# Patient Record
Sex: FEMALE | Race: WHITE | NOT HISPANIC OR LATINO | Employment: FULL TIME | URBAN - METROPOLITAN AREA
[De-identification: names, ages, dates, MRNs, and addresses within clinical notes are randomized per-mention and may not be internally consistent; named-entity substitution may affect disease eponyms.]

---

## 2017-01-24 ENCOUNTER — OFFICE VISIT (OUTPATIENT)
Dept: PSYCHOLOGY | Facility: CLINIC | Age: 47
End: 2017-01-24
Payer: COMMERCIAL

## 2017-01-24 DIAGNOSIS — F43.22 ADJUSTMENT DISORDER WITH ANXIETY: Primary | ICD-10-CM

## 2017-01-24 PROCEDURE — 90791 PSYCH DIAGNOSTIC EVALUATION: CPT | Performed by: PSYCHOLOGIST

## 2017-01-24 ASSESSMENT — ANXIETY QUESTIONNAIRES
6. BECOMING EASILY ANNOYED OR IRRITABLE: SEVERAL DAYS
7. FEELING AFRAID AS IF SOMETHING AWFUL MIGHT HAPPEN: NOT AT ALL
IF YOU CHECKED OFF ANY PROBLEMS ON THIS QUESTIONNAIRE, HOW DIFFICULT HAVE THESE PROBLEMS MADE IT FOR YOU TO DO YOUR WORK, TAKE CARE OF THINGS AT HOME, OR GET ALONG WITH OTHER PEOPLE: SOMEWHAT DIFFICULT
5. BEING SO RESTLESS THAT IT IS HARD TO SIT STILL: SEVERAL DAYS
3. WORRYING TOO MUCH ABOUT DIFFERENT THINGS: SEVERAL DAYS
1. FEELING NERVOUS, ANXIOUS, OR ON EDGE: SEVERAL DAYS
GAD7 TOTAL SCORE: 5
2. NOT BEING ABLE TO STOP OR CONTROL WORRYING: NOT AT ALL

## 2017-01-24 ASSESSMENT — PATIENT HEALTH QUESTIONNAIRE - PHQ9: 5. POOR APPETITE OR OVEREATING: SEVERAL DAYS

## 2017-01-24 NOTE — PROGRESS NOTES
"                                                                                                                                                                        Adult Intake Structured Interview  Standard Diagnostic Assessment      CLIENT'S NAME: Jie Siddiqui  MRN:   [id number]  :   1970  ACCT. NUMBER: [id number]  DATE OF SERVICE: 17      Identifying Information:  Client is a 46 year old, ,  and repartnered female. Client was referred for counseling by self. Client is currently employed full time. 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      Client's Statement of Presenting Concern:  Client reports the reason for seeking therapy at this time as Jie was recently told by her 12 year boyfriend that he does not want her to move in because she gets angry.  Client stated that her symptoms have resulted in the following functional impairments: home life with Seven and daughters, management of the household and or completion of tasks and social interactions      History of Presenting Concern:  Client reports that these problem(s) began a month ago. Client has attempted to resolve these concerns in the past through talking through with Seven. Client reports that other professional(s) are not involved in providing support / services.     Social History:  Client reported she grew up in [place], [place]. They were the first born of 2 children. This is an intact family and parents remain . Client reported that her childhood was \"quiet, simple, not well encouraged and supported by family and parents. Client described her current relationships with family of origin as oksy.    Client reported a history of 2 committed relationships and 1 marriage. Client has been  for 9 years to Kirk and then with Seven for 12 years. Client reported having 2 children with Kirk: Raquel age 15 and Danyell age 12. Client identified some stable and meaningful social connections. Client " "reported that she has not been involved with the legal system.  Client's highest education level was graduate school. Client did not identify any learning problems. There are no ethnic, cultural or Protestant factors that may be relevant for therapy. Client identified her preferred language to be English. Client reported she does not need the assistance of an  or other support involved in therapy. Modifications will not be used to assist communication in therapy. Client did not serve in the .     Client reports family history includes CANCER in her paternal grandmother; CEREBROVASCULAR DISEASE in her paternal grandmother; Lipids in her father and paternal grandmother; Psychotic Disorder in her maternal aunt. There is no history of Asthma, C.A.D., DIABETES, Hypertension, Breast Cancer, Cancer - colorectal, Prostate Cancer, Alcohol/Drug, Allergies, Alzheimer Disease, Anesthesia Reaction, Arthritis, Blood Disease, or Cardiovascular.    Mental Health History:  Client reported the following biological family members or relatives with mental health issues: Mother experienced Anxiety and Aunt experienced Bipolar Disorder and Depression.  Client has not been previously diagnosed with a mental health diagnosis.  Client has received the following mental health services in the past: counseling - \"Many times.\"  Hospitalizations: None.  Client is not currently receiving any mental health services.  Jie is not interested in medications.    Chemical Health History:  Client reported the following biological family members or relatives with chemical health issues: mom. Client has not received chemical dependency treatment in the past. Client is not currently receiving any chemical dependency treatment. Client reports no problems as a result of their drinking / drug use.    Client Reports:  Client reports using alcohol 2-4 times per week and has 1 small glass of wine at a time. Client first started drinking at age " 16.  Client denies using tobacco.  Client denies using marijuana.  Client reports using caffeine 1 times per day and drinks 1 at a time. Client started using caffeine at age 20.  Client denies using street drugs.  Client denies the non-medical use of prescription or over the counter drugs.    CAGE: G     Patient felt bad or GUILTY about their drinking (or drug use).   Based on the negative Cage-Aid score and clinical interview there  are not indications of drug or alcohol abuse.    Discussed the general effects of drugs and alcohol on health and well-being.    Significant Losses / Trauma / Abuse / Neglect Issues:  There are indications or report of significant loss, trauma, abuse or neglect issues related to: divorce / relational changes 2005 and gets along well with Kirk.    Issues of possible neglect are not present.    Medical Issues:  Client has had a physical exam to rule out medical causes for current symptoms. Date of last physical exam was within the past year. Client was encouraged to follow up with PCP if symptoms were to develop. The client has a [facility] Care Provider, who is named [place]. The client reports not having a psychiatrist. Client reports no current medical concerns. The client denies the presence of chronic or episodic pain. There are not significant nutritional concerns.     Client reports current medications as NA    Client Allergies:  Allergies   Allergen Reactions     Erythromycin      hives     Morphine      Hives       no known allergies to medications    Medical History:  Past Medical History   Diagnosis Date     Asthma lifetime     Endometriosis 2000     Polycystic ovarian disease (PCOD) 2000     Dysmenorrhea          Medication Adherence:  N/A - Client does not have prescribed psychiatric medications.    Client was provided recommendation to follow-up with prescribing physician.    Mental Status Assessment:  Appearance:   Appropriate   Eye  Contact:   Good   Psychomotor Behavior: Normal   Attitude:   Cooperative  Guarded  defensive   Orientation:   All  Speech   Rate / Production: Normal    Volume:  Normal   Mood:    Angry  Elevated  Normal  Affect:    Appropriate  Expansive   Thought Content:  Clear  Perservative   Thought Form:  Coherent  Logical   Insight:    Fair       Review of Symptoms:  Depression: No symptoms  Grecia:  No symptoms  Psychosis: No symptoms  Anxiety: Worries Nervousness Triggers: when losing independence  restless, busy all of the time  Panic:  No symptoms  Post Traumatic Stress Disorder: No symptoms  Obsessive Compulsive Disorder: No symptoms  Eating Disorder: No symptoms  Oppositional Defiant Disorder: No symptoms  ADD / ADHD: No symptoms  Conduct Disorder: No symptoms        Safety Issues and Plan for Safety and Risk Management:  Client denies a history of suicidal ideation, suicide attempts, self-injurious behavior, homicidal ideation, homicidal behavior and and other safety concerns  Client denies current fears or concerns for personal safety.  Client denies current or recent suicidal ideation or behaviors.  Client denies current or recent homicidal ideation or behaviors.  Client denies current or recent self injurious behavior or ideation.  Client denies other safety concerns.  Client reports there are no firearms in the house.  A safety and risk management plan has not been developed at this time, however client was given the after-hours number / 911 should there be a change in any of these risk factors.    Client's Strengths and Limitations:  Client identified the following strengths or resources that will help her succeed in counseling: commitment to health and well being, yesenia / spirituality, friends / good social support, family support, intelligence, positive work environment and sense of humor. Client identified the following supports: family and Buddhism / spirituality. Things that may interfere with the clients  success in counseling include:quick to be defensive - possibly shame-based.    Diagnostic Criteria:  A. The development of emotional or behavioral symptoms in response to an identifiable stressor(s) occurring within 3 months of the onset of the stressor(s)  B. These symptoms or behaviors are clinically significant, as evidenced by one or both of the following:  C. The stress-related disturbance does not meet criteria for another disorder & is not not an exacerbation of another mental disorder  D. The symptoms do not represent normal bereavement  E. Once the stressor or its consequences have terminated, the symptoms do not persist for more than an additional 6 months       * Adjustment Disorder with Anxiety: The predominant manfestations are symptoms such as nervousness, worry, or jitteriness, or, in children separation anxiety from major attachment figures    Functional Status:  Client's symptoms are causing reduced functional status in the following areas: Social / Relational - gets frustrated easily      DSM5 Diagnoses: (Sustained by DSM5 Criteria Listed Above)  Diagnoses: Adjustment Disorders  309.24 (F43.22) With anxiety  Psychosocial & Contextual Factors: relational problems, overly independent  WHODAS 2.0 (12 item)            This questionnaire asks about difficulties due to health conditions. Health conditions  include  disease or illnesses, other health problems that may be short or long lasting,  injuries, mental health or emotional problems, and problems with alcohol or drugs.                     Think back over the past 30 days and answer these questions, thinking about how much  difficulty you had doing the following activities. For each question, please [place] only  one response.    S1 Standing for long periods such as 30 minutes? None =         1   S2 Taking care of household responsibilities? None =         1   S3 Learning a new task, for example, learning how to get to a new place? None =         1    S4 How much of a problem do you have joining community activities (for example, festivals, Congregational or other activities) in the same way as anyone else can? None =         1   S5 How much have you been emotionally affected by your health problems? None =         1     In the past 30 days, how much difficulty did you have in:   S6 Concentrating on doing something for ten minutes? None =         1   S7 Walking a long distance such as a kilometer (or equivalent)? None =         1   S8 Washing your whole body? None =         1   S9 Getting dressed? None =         1   S10 Dealing with people you do not know? None =         1   S11 Maintaining a friendship? None =         1   S12 Your day to day work? None =         1     H1 Overall, in the past 30 days, how many days were these difficulties present? Record number of days 0   H2 In the past 30 days, for how many days were you totally unable to carry out your usual activities or work because of any health condition? Record number of days  0   H3 In the past 30 days, not counting the days that you were totally unable, for how many days did you cut back or reduce your usual activities or work because of any health condition? Record number of days 0     Attendance Agreement:  Client has signed Attendance Agreement:Yes      Preliminary Treatment Plan:  The client reports no currently identified Congregational, ethnic or cultural issues relevant to therapy.     services are not indicated.    Modifications to assist communication are not indicated.    The concerns identified by the client will be addressed in therapy.    Initial Treatment will focus on: Anxiety - upset and gets defensive  Relational Problems related to: decision about moving in with Seven.    The focus of initial interventions will be to alleviate anxiety, alleviate lability of mood, facilitate appropriate expression of feelings, teach emotional regulation and teach mindfulness skills.    Collaboration  with other professionals is not indicated at this time.    Referral to another professional/service is not indicated at this time..    A Release of Information is not needed at this time.    Report to child / adult protection services was NA.    Client will have access to their [facility]' medical record.    Bety Dexter, AYANNA  January 24, 2017

## 2017-01-25 ASSESSMENT — ANXIETY QUESTIONNAIRES: GAD7 TOTAL SCORE: 5

## 2017-01-25 ASSESSMENT — PATIENT HEALTH QUESTIONNAIRE - PHQ9: SUM OF ALL RESPONSES TO PHQ QUESTIONS 1-9: 0

## 2017-01-30 ENCOUNTER — OFFICE VISIT (OUTPATIENT)
Dept: PSYCHOLOGY | Facility: CLINIC | Age: 47
End: 2017-01-30
Payer: COMMERCIAL

## 2017-01-30 ENCOUNTER — VIRTUAL VISIT (OUTPATIENT)
Dept: FAMILY MEDICINE | Facility: OTHER | Age: 47
End: 2017-01-30

## 2017-01-30 DIAGNOSIS — F43.22 ADJUSTMENT DISORDER WITH ANXIETY: Primary | ICD-10-CM

## 2017-01-30 PROCEDURE — 90837 PSYTX W PT 60 MINUTES: CPT | Performed by: PSYCHOLOGIST

## 2017-01-30 NOTE — PROGRESS NOTES
Progress Note    Client Name: Jie Siddiqui  Date: 1/30/2017       Service Type: Individual      Session Start Time: 3:00  Session End Time: 3:52      Session Length: 52 min     Session #: 2     Attendees: Client attended alone    Treatment Plan Last Reviewed: today  PHQ-9 / LUCIANO-7: low score     DATA      Progress Since Last Session (Related to Symptoms / Goals / Homework):   Symptoms: Improved    Homework: Partially completed, she ordered the book and listened ans talked more to Seven      Episode of Care Goals: Satisfactory progress - ACTION (Actively working towards change); Intervened by reinforcing change plan / affirming steps taken     Current / Ongoing Stressors and Concerns:   Relational problems. Seven said he didn't want to live with her because she is too intense and angry.     Treatment Objective(s) Addressed in This Session:   practice deep diaphragm breathing once daily for at least 5 minutes to reduce anger / irritability and regain a calmer, more clear state of mind  track and record at least 2 pleasant exchanges with boyfriend  Improve communication     Intervention:   CBT: thoughts about all or nothing thinking  Interpersonal Therapy: to be more positive and reduce judgement.   Identified goals     ASSESSMENT: Current Emotional / Mental Status (status of significant symptoms):   Risk status (Self / Other harm or suicidal ideation)  Client denies a history of suicidal ideation, suicide attempts, self-injurious behavior, homicidal ideation, homicidal behavior and and other safety concerns   Client denies current fears or concerns for personal safety.   Client denies current or recent suicidal ideation or behaviors.   Client denies current or recent homicidal ideation or behaviors.   Client denies current or recent self injurious behavior or ideation.   Client denies other safety concerns.   A safety and risk management plan has not been developed at this  time, however client was given the after-hours number / 911 should there be a change in any of these risk factors.     Appearance:   Appropriate    Eye Contact:   Good    Psychomotor Behavior: Normal    Attitude:   Cooperative  that she knows things   Orientation:   All   Speech    Rate / Production: Normal     Volume:  Normal    Mood:    Normal   Affect:    Appropriate    Thought Content:  Clear    Thought Form:  Coherent  Logical    Insight:    Fair      Medication Review:   No current psychiatric medications prescribed     Medication Compliance:   NA     Changes in Health Issues:   None reported     Chemical Use Review:   Substance Use: Chemical use reviewed, no active concerns identified      Tobacco Use: No current tobacco use.       Collateral Reports Completed:   Not Applicable    PLAN: (Client Tasks / Therapist Tasks / Other)  Jie will go to [facility] with Seven. She will improve positive affirmations and read book: DO I have to give up me to be loved by you and 15 things not to do to be happy.  Be aware of judgement and all or nothing thinking.      Bety Dexter, AYANNA                                                    ________________________________________________________________________    Treatment Plan    Client's Name: Jie Siddiqui  YOB: 1970    Date: 1/30/2017    DSM-V Diagnoses:  Adjustment Disorders  309.24 (F43.22) With anxiety  Psychosocial & Contextual Factors: relational problems, overly independent  WHODAS: 12 at intake    Referral / Collaboration:  Referral to another professional/service is not indicated at this time..    Anticipated number of session or this episode of care: 6      MeasurableTreatment Goal(s) related to diagnosis / functional impairment(s)  Goal 1: Client will improve her relationship with her boyfriend.    I will know I've met my goal when I improve my communication and prioritizing strategies.      Objective #A (Client Action)    Client will identify  at least 2 techniques for intervening on the escalation  Identify negative self-talk and behaviors: challenge core beliefs, myths, and actions.  Status: New - Date: 1/30/17     Intervention(s)  Therapist will teach CBT and understanding her thoughts that contribute to agitation.    Objective #B  Client will Identify negative self-talk and behaviors: challenge core beliefs, myths, and actions  track and record at least more frequently pleasant exchanges with boyfriend.  Status: New - Date: 1/30/17     Intervention(s)  Therapist will role-play effective communication skills.    Client has reviewed and agreed to the above plan.    Bety Dexter, AYANNA  January 30, 2017

## 2017-01-30 NOTE — PROGRESS NOTES
"Date: [id number]  Clinician: Joel Wegener  Clinician NPI: [id number]  Patient: Jie Siddiqui  Patient : 1970  Patient Address: [address] [place], [place] [address]  Patient Phone: [phone]  Visit Protocol: UTI  Patient Summary:  Jie is a 46 year old ( : 1970 ) female who initiated a Zip for a presumed bladder infection. When asked the question \"Do you have a [facility] primary care physician?\", iJe responded \"Yes\".    Her symptoms began yesterday and consist of urgency, urinary frequency, and dysuria.   Symptom Details   Urinary Frequency: Every hour    She denies abdominal pain, vomiting, nausea, loss of appetite, chills, fever, urinary incontinence, hesitation, flank pain, vaginal discharge, hematuria, foul smelling urine, and recent antibiotic use. Jie has never had kidney stones. She has not been hospitalized, been a patient in a nursing home, or had a catheter in the past two weeks. She denies risk factors for sexually transmitted infections.   Jie has not had any UTIs in the past 12 months. Her current symptoms are similar to the previous UTI symptoms. She took ciprofloxacin for her last infection and found it to be effective.   Jie does not get yeast infections when she takes antibiotics.   She states she is not pregnant and denies breastfeeding. She no longer menstruates.   She does NOT smoke or use smokeless tobacco.  MEDICATIONS:  No current medications   , ALLERGIES:    Patient free text response:   morphine and erythromycin    Clinician Response:  Dear Jie,  Based on the information you have provided, you likely have a bladder infection, also called an acute urinary tract infection (UTI).   To treat your infection, I am prescribing:   Ciprofloxacin (Cipro). Swallow one (1) 250 mg tablet twice a day for 3 days. Continue taking the tablets even if you feel better before all the medication is gone. There is no refill with this prescription.   Antibiotic selections by " the clinician are based on safety and effectiveness. You may or may not be prescribed the same medication that you took for your last bladder infection.   This medication is in the fluoroquinolone drug class. Fluoroquinolones are associated with increased risk of tendinitis and tendon rupture in all ages, which can occur during or after treatment. The risk of tendon problems may be increased if you are over 60 years of age, are using steroid medication, have severe kidney disease, or if you have a history of tendon problems. If you experience pain, swelling, inflammation, or rupture of a tendon, stop taking this medication and visit your healthcare clinician or an urgent care.   Some people develop allergies to antibiotics. If you notice a new rash, significant swelling, or difficulty breathing, stop the medication immediately and go into a clinic for physical evaluation.   To help treat your current UTI and prevent future occurrences, remember to:     Drink 8-10, 8-ounce glasses of water daily.    Urinate after sexual intercourse.    Wipe front to back after using the bathroom.     Some women may develop a yeast infection as a side effect of taking antibiotics. If you notice symptoms of a yeast infection, Zipnosis can help treat that condition as well. Simply log in and complete another Zip, which will cover all of the necessary questions to determine the best treatment for you.   You should visit a clinic for a follow-up visit if your symptoms do not improve in 1-2 days or if you experience another urinary tract infection soon after completing this treatment.  If you become pregnant during this course of treatment, stop taking the medication and contact your primary care clinician.   Diagnosis: Acute Uncomplicated Bladder Infection  Diagnosis ICD: N39.0  Prescription: ciprofloxacin (Cipro) 250mg oral tablet 6 tablets, 3 days supply. Take one tablet by mouth two times a day for 3 days. Refills: 0, Refill as needed:  no, Allow substitutions: yes  Prescription Sent At: January 30 09:44:56, 2017  Pharmacy: [id number] - [phone] - [address] [place] [address]

## 2017-04-19 ENCOUNTER — TELEPHONE (OUTPATIENT)
Dept: OBGYN | Facility: CLINIC | Age: 47
End: 2017-04-19

## 2017-04-19 DIAGNOSIS — Z00.00 ENCOUNTER FOR ROUTINE ADULT HEALTH EXAMINATION WITHOUT ABNORMAL FINDINGS: Primary | ICD-10-CM

## 2017-04-19 NOTE — TELEPHONE ENCOUNTER
Pt calls, left vm requesting results be faxed to her personal office fax: [phone].    Titer results faxed to above.

## 2017-04-19 NOTE — TELEPHONE ENCOUNTER
After reviewing epic further, appears pt had all these titers done in 2014 with MD note indicating immunity to all.    Called pt, left vm relaying above information and that if needed did receive orders for titers.

## 2017-04-19 NOTE — TELEPHONE ENCOUNTER
Pt calls back, left vm stating she won't need titer orders. Requesting 2014 results be faxed.    Called pt, left vm that results will be at  and if wishes for them to be faxed to call back with fax number.    08/28/14 titer results at OB .

## 2017-04-19 NOTE — TELEPHONE ENCOUNTER
Pt calls, requesting orders for Hep B, MMR, and varicella titer. She's needing proof for an employer. Hoping to have drawn in [facility] today at her lunch break. States she doesn't see a doctor other than Dr. Ruiz on a yearly basis. Last OV: 04/25/16. Has an upcoming appt with Dr. Ruiz for 05/01. Asking for orders before this appt as employer is needed ASAP.    Pended requested titers.    Routed to Dr. Martin (on-call) as Dr. Ruiz is out of the office this AM.    Please advise, thanks.

## 2017-05-01 ENCOUNTER — OFFICE VISIT (OUTPATIENT)
Dept: OBGYN | Facility: CLINIC | Age: 47
End: 2017-05-01
Payer: COMMERCIAL

## 2017-05-01 VITALS
HEIGHT: 65 IN | WEIGHT: 117.2 LBS | SYSTOLIC BLOOD PRESSURE: 92 MMHG | TEMPERATURE: 97.7 F | DIASTOLIC BLOOD PRESSURE: 50 MMHG | BODY MASS INDEX: 19.53 KG/M2

## 2017-05-01 DIAGNOSIS — Z12.31 ENCOUNTER FOR SCREENING MAMMOGRAM FOR BREAST CANCER: Primary | ICD-10-CM

## 2017-05-01 DIAGNOSIS — Z00.00 ROUTINE HISTORY AND PHYSICAL EXAMINATION OF ADULT: ICD-10-CM

## 2017-05-01 PROCEDURE — 99396 PREV VISIT EST AGE 40-64: CPT | Performed by: OBSTETRICS & GYNECOLOGY

## 2017-05-01 NOTE — NURSING NOTE
"Chief Complaint   Patient presents with     Gyn Exam   No concerns.  Alyce Melvin MA      Initial BP 92/50 (BP Location: Right arm, Patient Position: Chair, Cuff Size: Adult Regular)  Temp 97.7  F (36.5  C) (Oral)  Ht 5' 5\" (1.651 m)  Wt 117 lb 3.2 oz (53.2 kg)  LMP 11/22/2013  BMI 19.5 kg/m2 Estimated body mass index is 19.5 kg/(m^2) as calculated from the following:    Height as of this encounter: 5' 5\" (1.651 m).    Weight as of this encounter: 117 lb 3.2 oz (53.2 kg).  Medication Reconciliation: complete    "

## 2017-05-01 NOTE — MR AVS SNAPSHOT
"              After Visit Summary   2017    Jie Siddiqui    MRN: [id number]           Patient Information     Date Of Birth          1970        Visit Information        Provider Department      2017 6:00 PM Vijay Ruiz MD [facility]        Today's Diagnoses     Encounter for screening mammogram for breast cancer    -  1    Routine history and physical examination of adult           Follow-ups after your visit        Who to contact     If you have questions or need follow up information about today's clinic visit or your schedule please contact [facility] directly at [phone].  Normal or non-critical lab and imaging results will be communicated to you by [employer], letter or phone within 4 business days after the clinic has received the results. If you do not hear from us within 7 days, please contact the clinic through [employer] or phone. If you have a critical or abnormal lab result, we will notify you by phone as soon as possible.  Submit refill requests through [employer] or call your pharmacy and they will forward the refill request to us. Please allow 3 business days for your refill to be completed.          Additional Information About Your Visit        MyChart Information     [employer] lets you send messages to your doctor, view your test results, renew your prescriptions, schedule appointments and more. To sign up, go to [email or web address] . Click on \"Log in\" on the left side of the screen, which will take you to the Welcome page. Then click on \"Sign up Now\" on the right side of the page.     You will be asked to enter the access code listed below, as well as some personal information. Please follow the directions to create your username and password.     Your access code is: [email or web address]  Expires: 2017  2:04 PM     Your access code will  in 90 days. If you need help or a new code, please call your [facility] or [phone].      " "  Care EveryWhere ID     This is your Care EveryWhere ID. This could be used by other organizations to access your [facility] medical records  [id number]        Your Vitals Were     Temperature Height Last Period BMI (Body Mass Index)          97.7  F (36.5  C) (Oral) 5' 5\" (1.651 m) 11/22/2013 19.5 kg/m2         Blood Pressure from Last 3 Encounters:   05/01/17 92/50   04/25/16 100/64   11/18/15 106/58    Weight from Last 3 Encounters:   05/01/17 117 lb 3.2 oz (53.2 kg)   04/25/16 116 lb (52.6 kg)   11/18/15 118 lb 4.8 oz (53.7 kg)              Today, you had the following     No orders found for display       Primary Care Provider    None Specified       No primary provider on file.        Thank you!     Thank you for choosing [facility]  for your care. Our goal is always to provide you with excellent care. Hearing back from our patients is one way we can continue to improve our services. Please take a few minutes to complete the written survey that you may receive in the mail after your visit with us. Thank you!             Your Updated Medication List - Protect others around you: Learn how to safely use, store and throw away your medicines at www.disposemymeds.org.          This list is accurate as of: 5/1/17 11:59 PM.  Always use your most recent med list.                   Brand Name Dispense Instructions for use    albuterol 108 (90 BASE) MCG/ACT Inhaler    albuterol    1 Inhaler    Inhale 2 puffs into the lungs every 4 hours as needed for shortness of breath / dyspnea       ibuprofen 200 MG tablet    ADVIL/MOTRIN    100 tablet    Take 1 tablet by mouth every 4 hours as needed for pain.       loratadine 10 MG tablet    CLARITIN     Take 10 mg by mouth daily       PROBIOTIC DAILY PO          VITAMIN D (CHOLECALCIFEROL) PO      Take 5,000 Units by mouth daily         "

## 2017-05-01 NOTE — PROGRESS NOTES
SUBJECTIVE:  Jie Siddiqui is a 47 year old,  female,  P2 woman who presents for annual exam. Patient's last menstrual period was 2013.  Total Laparoscopic hysterectomy , bilateral salpingectomy.  Current contraception: none  History of abnormal Pap smear: yes - nl repeat  Regular self breast exam: No  Family history of breast cancer: timoteo, GM. Colon cancer no. Ovarian cancer no.  History of abnormal lipids: no      Past Medical History:   Diagnosis Date     Asthma lifetime     Dysmenorrhea      Endometriosis      Polycystic ovarian disease (PCOD)        Past Surgical History:   Procedure Laterality Date     C NONSPECIFIC PROCEDURE  ,     C/S x2   01, 2004     C NONSPECIFIC PROCEDURE      Laparoscopy (endometriosis)  Abstracted 02     C NONSPECIFIC PROCEDURE      breast augmentation     ECTOPIC PREGNANCY SURGERY      cyst noted in abdomen not tubes     LAPAROSCOPIC HYSTERECTOMY TOTAL, BILATERAL SALPINGO-OOPHORECTOMY, COMBINED  2013    Procedure: COMBINED LAPAROSCOPIC HYSTERECTOMY TOTAL, SALPINGO-OOPHORECTOMY;  LAPAROSCOPIC HYSTERECTOMY   BILATERAL SALPINGECTOMY   and cystoscopy;  Surgeon: Vijay Ruiz MD;  Location:  OR       Current Outpatient Prescriptions   Medication     Probiotic Product (PROBIOTIC DAILY PO)     VITAMIN D, CHOLECALCIFEROL, PO     loratadine (CLARITIN) 10 MG tablet     albuterol (ALBUTEROL) 108 (90 BASE) MCG/ACT inhaler     ibuprofen (ADVIL,MOTRIN) 200 MG tablet     No current facility-administered medications for this visit.      Allergies   Allergen Reactions     Erythromycin      hives     Morphine      Hives         Social History   Substance Use Topics     Smoking status: Never Smoker     Smokeless tobacco: Never Used     Alcohol use 0.0 oz/week     0 Standard drinks or equivalent per week      Comment: occasional wine 1x weekly       Review of Systems    CONSTITUTIONAL:NEGATIVE  EYES: NEGATIVE  ENT/MOUTH:  "NEGATIVE  RESP: NEGATIVE  CV: NEGATIVE  GI: NEGATIVE  : NEGATIVE  MUSCULOSKELATAL: NEGATIVE  INTEGUMENTARY/SKIN: NEGATIVE  BREAST: NEGATIVE  NEURO: NEGATIVE.      OBJECTIVE:  BP 92/50 (BP Location: Right arm, Patient Position: Chair, Cuff Size: Adult Regular)  Temp 97.7  F (36.5  C) (Oral)  Ht 5' 5\" (1.651 m)  Wt 117 lb 3.2 oz (53.2 kg)  LMP 11/22/2013  BMI 19.5 kg/m2  General appearance: Healthy.  Skin: Normal.  Mental Status: cooperative, normal affect, no gross thought process defects.  Thyroid: Normal to palpation, no enlargement or nodules noted.  Breasts: Symmetric without mass tenderness or discharge.  Axillary nodes negative.  Lungs: Clear to auscultation.   Heart.: Normal rate and rhythm.  No murmurs, clicks or gallops.   Abdomen: BS active. Soft, non-tender, no masses or organomegaly.    Pelvis: normal external genitalia, normal groin lymphatics, normal urethral meatus, normal vaginal mucosa and normal adnexa, no masses or tenderness  Extremities: Normal     ASSESSMENT:  Satisfactory annual gyn exam    PLAN:    1) Pap smear  2) Mammography, lipids at appropriate intervals        PE: reviewed health maintenance including diet, regular exercise and periodic exams.   "

## 2017-05-08 ENCOUNTER — RADIANT APPOINTMENT (OUTPATIENT)
Dept: MAMMOGRAPHY | Facility: CLINIC | Age: 47
End: 2017-05-08
Attending: OBSTETRICS & GYNECOLOGY
Payer: COMMERCIAL

## 2017-05-08 DIAGNOSIS — Z12.31 ENCOUNTER FOR SCREENING MAMMOGRAM FOR BREAST CANCER: ICD-10-CM

## 2017-05-08 PROCEDURE — G0202 SCR MAMMO BI INCL CAD: HCPCS | Performed by: RADIOLOGY

## 2018-01-15 ENCOUNTER — FCC EXTENDED DOCUMENTATION (OUTPATIENT)
Dept: PSYCHOLOGY | Facility: CLINIC | Age: 48
End: 2018-01-15

## 2018-01-15 DIAGNOSIS — F43.22 ADJUSTMENT DISORDER WITH ANXIETY: Primary | ICD-10-CM

## 2018-01-15 NOTE — PROGRESS NOTES
Discharge Summary  Multiple Sessions    Client Name: Jie Siddiqui MRN#: [id number] YOB: 1970      Intake / Discharge Date: 1/24/17 - 1/30/17  2 sessions    DSM5 Diagnoses:   Diagnoses: Adjustment Disorders  309.24 (F43.22) With anxiety  Psychosocial & Contextual Factors: relational problems, overly independent  WHODAS: 12 at intake          Presenting Concern:  Client reports the reason for seeking therapy at this time as Jie was recently told by her 12 year boyfriend that he does not want her to move in because she gets angry.       Reason for Discharge:  Client is satisfied with progress and Client did not return      Disposition at Time of Last Encounter:   Comments:   She felt better. No safety concerns     Risk Management:   Client denies a history of suicidal ideation, suicide attempts, self-injurious behavior, homicidal ideation, homicidal behavior and and other safety concerns  A safety and risk management plan has not been developed at this time, however client was given the after-hours number / 911 should there be a change in any of these risk factors.      Referred To:  The plan is that Jie will go to [facility] with [facility]. She will improve positive affirmations and read book: DO I have to give up me to be loved by you and 15 things not to do to be happy.    Bety Dexter, AYANNA   1/15/2018

## 2018-02-14 DIAGNOSIS — J30.2 ACUTE SEASONAL ALLERGIC RHINITIS DUE TO OTHER ALLERGEN: ICD-10-CM

## 2018-02-14 DIAGNOSIS — J45.20 MILD INTERMITTENT ASTHMA, UNSPECIFIED WHETHER COMPLICATED: ICD-10-CM

## 2018-02-14 NOTE — TELEPHONE ENCOUNTER
Pt calling stating she is leaving next Friday the 23rd for a trip to [place], [place], [place], and [place] for 16 days. She knows it is too late for the travel immunizations, but she is wondering if it is possible to get a rx for Cipro and a new inhaler for the trip. She does not have a primary care doctor that she sees, so she has no other provider to ask for these. Please advise.      Nicky García RN

## 2018-02-15 NOTE — TELEPHONE ENCOUNTER
Is this for travelers diarrhea ( Zithromax is drug of choice)? What is the Cipro used to treat?. Happy to send a script but I need to know this for dosing? What type of inhaler and dose? Please notify patient.

## 2018-02-16 RX ORDER — ALBUTEROL SULFATE 90 UG/1
2 AEROSOL, METERED RESPIRATORY (INHALATION) EVERY 4 HOURS PRN
Qty: 1 INHALER | Refills: 1 | Status: SHIPPED | OUTPATIENT
Start: 2018-02-16 | End: 2018-11-11

## 2018-02-16 RX ORDER — AZITHROMYCIN 500 MG/1
500 TABLET, FILM COATED ORAL DAILY
Qty: 3 TABLET | Status: CANCELLED | OUTPATIENT
Start: 2018-02-16

## 2018-02-16 NOTE — TELEPHONE ENCOUNTER
Pt is worried about exposure to a lot of bacteria when she is gone. She would like to prevent travelers diarrhea and UTI. Patient would like the inhaler she has been rx'd before. The inhaler medication is pended. Please prescribe appropriate antibiotic for the patient's requests.      Nicky García RN

## 2018-02-20 ENCOUNTER — TELEPHONE (OUTPATIENT)
Dept: OBGYN | Facility: CLINIC | Age: 48
End: 2018-02-20

## 2018-02-20 DIAGNOSIS — R30.0 DYSURIA: Primary | ICD-10-CM

## 2018-02-20 RX ORDER — CIPROFLOXACIN 250 MG/1
250 TABLET, FILM COATED ORAL 2 TIMES DAILY
Qty: 6 TABLET | Refills: 1 | Status: SHIPPED | OUTPATIENT
Start: 2018-02-20 | End: 2018-11-13

## 2018-02-20 RX ORDER — CIPROFLOXACIN 250 MG/1
250 TABLET, FILM COATED ORAL 2 TIMES DAILY
Qty: 6 TABLET | Refills: 1 | Status: SHIPPED | OUTPATIENT
Start: 2018-02-20 | End: 2018-02-20

## 2018-02-20 RX ORDER — CIPROFLOXACIN 500 MG/1
500 TABLET, FILM COATED ORAL 2 TIMES DAILY
Qty: 3 TABLET | Refills: 1 | Status: SHIPPED | OUTPATIENT
Start: 2018-02-20 | End: 2018-02-20

## 2018-02-20 NOTE — TELEPHONE ENCOUNTER
Spoke with pt. She got the rx for the inhaler, but the pharmacy not got an rx for the Cipro that the pt was requesting.     Can you please rx cipro for the pt and advise once it is faxed.    FELICIA Stokes RN

## 2018-02-20 NOTE — TELEPHONE ENCOUNTER
CARTER for pt to cb.    She did mention in previous message concern for both uti and travelers diarrhea.  I think that is why she wanted cipro.      Anette MONTILLA R.N.  [facility]

## 2018-02-20 NOTE — TELEPHONE ENCOUNTER
Reason for Call:  Medication or medication refill:    Do you use a [facility]?  Name of the pharmacy and phone number for the current request: [address] in [place]     Name of the medication requested: sipro -received rx for another but not this     Other request: none     Can we leave a detailed message on this number? YES    Phone number patient can be reached at: [phone]  Best Time: asap    Call taken on 2/20/2018 at 8:38 AM by Muna Valencia

## 2018-06-11 ENCOUNTER — OFFICE VISIT (OUTPATIENT)
Dept: OBGYN | Facility: CLINIC | Age: 48
End: 2018-06-11
Payer: COMMERCIAL

## 2018-06-11 VITALS
SYSTOLIC BLOOD PRESSURE: 92 MMHG | BODY MASS INDEX: 19.99 KG/M2 | WEIGHT: 120 LBS | HEIGHT: 65 IN | DIASTOLIC BLOOD PRESSURE: 60 MMHG

## 2018-06-11 DIAGNOSIS — Z00.00 ROUTINE HISTORY AND PHYSICAL EXAMINATION OF ADULT: ICD-10-CM

## 2018-06-11 DIAGNOSIS — Z12.31 VISIT FOR SCREENING MAMMOGRAM: Primary | ICD-10-CM

## 2018-06-11 PROCEDURE — 99396 PREV VISIT EST AGE 40-64: CPT | Performed by: OBSTETRICS & GYNECOLOGY

## 2018-06-11 NOTE — NURSING NOTE
"Chief Complaint   Patient presents with     Gyn Exam   lump in right arm.  Alyce Melvin MA      Initial BP 92/60 (BP Location: Right arm, Patient Position: Chair, Cuff Size: Adult Regular)  Ht 5' 5\" (1.651 m)  Wt 120 lb (54.4 kg)  LMP 2013  BMI 19.97 kg/m2 Estimated body mass index is 19.97 kg/(m^2) as calculated from the following:    Height as of this encounter: 5' 5\" (1.651 m).    Weight as of this encounter: 120 lb (54.4 kg).  BP completed using cuff size: regular        The following HM Due: NONE      The following patient reported/Care Every where data was sent to:  P ABSTRACT QUALITY INITIATIVES [10317]                          "

## 2018-06-11 NOTE — PROGRESS NOTES
SUBJECTIVE:  Jie Siddiqui is a 48 year old,  female,  P2 woman who presents for annual exam. Patient's last menstrual period was 2013. .  Total Laparoscopic hysterectomy , bilateral salpingectomy.  Current contraception: none  History of abnormal Pap smear: yes - nl repeat  Regular self breast exam: No  Family history of breast cancer: timoteo, GM. Colon cancer no. Ovarian cancer no.  History of abnormal lipids: no      Past Medical History:   Diagnosis Date     Asthma lifetime     Dysmenorrhea      Endometriosis      Polycystic ovarian disease (PCOD)        Past Surgical History:   Procedure Laterality Date     C NONSPECIFIC PROCEDURE  ,     C/S x2   01, 2004     C NONSPECIFIC PROCEDURE      Laparoscopy (endometriosis)  Abstracted 02     C NONSPECIFIC PROCEDURE      breast augmentation     ECTOPIC PREGNANCY SURGERY      cyst noted in abdomen not tubes     LAPAROSCOPIC HYSTERECTOMY TOTAL, BILATERAL SALPINGO-OOPHORECTOMY, COMBINED  2013    Procedure: COMBINED LAPAROSCOPIC HYSTERECTOMY TOTAL, SALPINGO-OOPHORECTOMY;  LAPAROSCOPIC HYSTERECTOMY   BILATERAL SALPINGECTOMY   and cystoscopy;  Surgeon: Vijay Ruiz MD;  Location: [facility] OR       Current Outpatient Prescriptions   Medication     albuterol (PROAIR HFA) 108 (90 BASE) MCG/ACT Inhaler     ciprofloxacin (CIPRO) 250 MG tablet     ibuprofen (ADVIL,MOTRIN) 200 MG tablet     loratadine (CLARITIN) 10 MG tablet     Probiotic Product (PROBIOTIC DAILY PO)     VITAMIN D, CHOLECALCIFEROL, PO     No current facility-administered medications for this visit.      Allergies   Allergen Reactions     Erythromycin      hives     Morphine      Hives         Social History   Substance Use Topics     Smoking status: Never Smoker     Smokeless tobacco: Never Used     Alcohol use 0.0 oz/week     0 Standard drinks or equivalent per week      Comment: occasional wine 1x weekly       Review of  "Systems    CONSTITUTIONAL:NEGATIVE  EYES: NEGATIVE  ENT/MOUTH: NEGATIVE  RESP: NEGATIVE  CV: NEGATIVE  GI: NEGATIVE  : NEGATIVE  MUSCULOSKELATAL: NEGATIVE  INTEGUMENTARY/SKIN: NEGATIVE  BREAST: NEGATIVE  NEURO: NEGATIVE.      OBJECTIVE:  BP 92/60 (BP Location: Right arm, Patient Position: Chair, Cuff Size: Adult Regular)  Ht 5' 5\" (1.651 m)  Wt 120 lb (54.4 kg)  LMP 11/22/2013  BMI 19.97 kg/m2  General appearance: Healthy.  Skin: Normal.  Mental Status: cooperative, normal affect, no gross thought process defects.  Thyroid: Normal to palpation, no enlargement or nodules noted.  Breasts: Symmetric without mass tenderness or discharge.  Axillary nodes negative.  Lungs: Clear to auscultation.   Heart.: Normal rate and rhythm.  No murmurs, clicks or gallops.   Abdomen: BS active. Soft, non-tender, no masses or organomegaly.    Pelvis: normal external genitalia, normal groin lymphatics, normal urethral meatus, normal vaginal mucosa and normal adnexa, no masses or tenderness  Extremities: Normal     ASSESSMENT:  Satisfactory annual gyn exam    PLAN:    1) Pap smear not appropriate  2) Mammography, lipids at appropriate intervals        PE: reviewed health maintenance including diet, regular exercise and periodic exams.   "

## 2018-06-11 NOTE — MR AVS SNAPSHOT
"              After Visit Summary   6/11/2018    Jie Siddiqui    MRN: [id number]           Patient Information     Date Of Birth          1970        Visit Information        Provider Department      6/11/2018 5:45 PM Vijay Ruiz MD [facility]        Today's Diagnoses     Visit for screening mammogram    -  1    Routine history and physical examination of adult           Follow-ups after your visit        Future tests that were ordered for you today     Open Future Orders        Priority Expected Expires Ordered    *MA Screening Digital Bilateral Routine  6/11/2019 6/11/2018            Who to contact     If you have questions or need follow up information about today's clinic visit or your schedule please contact [facility] directly at [phone].  Normal or non-critical lab and imaging results will be communicated to you by MyChart, letter or phone within 4 business days after the clinic has received the results. If you do not hear from us within 7 days, please contact the clinic through MyChart or phone. If you have a critical or abnormal lab result, we will notify you by phone as soon as possible.  Submit refill requests through [employer] or call your pharmacy and they will forward the refill request to us. Please allow 3 business days for your refill to be completed.          Additional Information About Your Visit        Care EveryWhere ID     This is your Care EveryWhere ID. This could be used by other organizations to access your [facility] medical records  [id number]        Your Vitals Were     Height Last Period BMI (Body Mass Index)             5' 5\" (1.651 m) 11/22/2013 19.97 kg/m2          Blood Pressure from Last 3 Encounters:   06/11/18 92/60   05/01/17 92/50   04/25/16 100/64    Weight from Last 3 Encounters:   06/11/18 120 lb (54.4 kg)   05/01/17 117 lb 3.2 oz (53.2 kg)   04/25/16 116 lb (52.6 kg)               Primary Care Provider    None Specified "       No primary provider on file.        Equal Access to Services     [facility] LUCITA : Hadii aad ku hadmalinagabe Fuentes, waerikda dayanaraleticiaha, qakhadijahta zoieanthonysalvatore bettencourt. So [facility] [phone].    ATENCIÓN: Si habla español, tiene a ring disposición servicios gratuitos de asistencia lingüística. Llame al [phone].    We comply with applicable federal civil rights laws and [place] laws. We do not discriminate on the basis of race, color, national origin, age, disability, sex, sexual orientation, or gender identity.            Thank you!     Thank you for choosing [facility]  for your care. Our goal is always to provide you with excellent care. Hearing back from our patients is one way we can continue to improve our services. Please take a few minutes to complete the written survey that you may receive in the mail after your visit with us. Thank you!             Your Updated Medication List - Protect others around you: Learn how to safely use, store and throw away your medicines at www.disposemymeds.org.          This list is accurate as of 6/11/18  6:19 PM.  Always use your most recent med list.                   Brand Name Dispense Instructions for use Diagnosis    albuterol 108 (90 Base) MCG/ACT Inhaler    PROAIR HFA    1 Inhaler    Inhale 2 puffs into the lungs every 4 hours as needed for shortness of breath / dyspnea    Mild intermittent asthma, unspecified whether complicated, Acute seasonal allergic rhinitis due to other allergen       ciprofloxacin 250 MG tablet    CIPRO    6 tablet    Take 1 tablet (250 mg) by mouth 2 times daily    Dysuria       ibuprofen 200 MG tablet    ADVIL/MOTRIN    100 tablet    Take 1 tablet by mouth every 4 hours as needed for pain.        loratadine 10 MG tablet    CLARITIN     Take 10 mg by mouth daily        PROBIOTIC DAILY PO           VITAMIN D (CHOLECALCIFEROL) PO      Take 5,000 Units by mouth daily

## 2018-06-19 ENCOUNTER — RADIANT APPOINTMENT (OUTPATIENT)
Dept: MAMMOGRAPHY | Facility: CLINIC | Age: 48
End: 2018-06-19
Attending: OBSTETRICS & GYNECOLOGY
Payer: COMMERCIAL

## 2018-06-19 DIAGNOSIS — Z12.31 VISIT FOR SCREENING MAMMOGRAM: ICD-10-CM

## 2018-06-19 PROCEDURE — 77067 SCR MAMMO BI INCL CAD: CPT

## 2018-09-25 DIAGNOSIS — R06.00 DYSPNEA, UNSPECIFIED TYPE: Primary | ICD-10-CM

## 2018-09-26 ENCOUNTER — OFFICE VISIT (OUTPATIENT)
Dept: PULMONOLOGY | Facility: CLINIC | Age: 48
End: 2018-09-26
Attending: INTERNAL MEDICINE
Payer: COMMERCIAL

## 2018-09-26 ENCOUNTER — RADIANT APPOINTMENT (OUTPATIENT)
Dept: CT IMAGING | Facility: CLINIC | Age: 48
End: 2018-09-26
Attending: CLINICAL NURSE SPECIALIST
Payer: COMMERCIAL

## 2018-09-26 VITALS
WEIGHT: 119.1 LBS | TEMPERATURE: 98.1 F | HEIGHT: 65 IN | DIASTOLIC BLOOD PRESSURE: 43 MMHG | HEART RATE: 77 BPM | BODY MASS INDEX: 19.84 KG/M2 | OXYGEN SATURATION: 99 % | RESPIRATION RATE: 18 BRPM | SYSTOLIC BLOOD PRESSURE: 126 MMHG

## 2018-09-26 DIAGNOSIS — R06.00 DYSPNEA, UNSPECIFIED TYPE: Primary | ICD-10-CM

## 2018-09-26 DIAGNOSIS — R06.00 DYSPNEA, UNSPECIFIED TYPE: ICD-10-CM

## 2018-09-26 PROCEDURE — G0463 HOSPITAL OUTPT CLINIC VISIT: HCPCS | Mod: ZF

## 2018-09-26 ASSESSMENT — PAIN SCALES - GENERAL: PAINLEVEL: MILD PAIN (3)

## 2018-09-26 NOTE — NURSING NOTE
"Oncology Rooming Note    September 26, 2018 8:02 AM   Jie Siddiqui is a 48 year old female who presents for:    Chief Complaint   Patient presents with     Oncology Clinic Visit     Return visit related to Lung Nodule     Initial Vitals: /43 (BP Location: Left arm, Patient Position: Sitting, Cuff Size: Adult Small)  Pulse 77  Temp 98.1  F (36.7  C) (Tympanic)  Resp 18  Ht 1.651 m (5' 5\")  Wt 54 kg (119 lb 1.6 oz)  LMP 11/22/2013  SpO2 99%  BMI 19.82 kg/m2 Estimated body mass index is 19.82 kg/(m^2) as calculated from the following:    Height as of this encounter: 1.651 m (5' 5\").    Weight as of this encounter: 54 kg (119 lb 1.6 oz). Body surface area is 1.57 meters squared.  Mild Pain (3) Comment: Data Unavailable   Patient's last menstrual period was 11/22/2013.  Allergies reviewed: Yes  Medications reviewed: Yes    Medications: Medication refills not needed today.  Pharmacy name entered into Active DSP: [facility] [address] [place] [id number] [facility] [address]    Clinical concerns: No new concerns. Provider was notified.    10 minutes for nursing intake (face to face time)     Lexy Gonzalez LPN            "

## 2018-09-26 NOTE — MR AVS SNAPSHOT
"              After Visit Summary   9/26/2018    Jie Siddiqui    MRN: [id number]           Patient Information     Date Of Birth          1970        Visit Information        Provider Department      9/26/2018 8:00 AM Mendez Dorado MD [facility]        Today's Diagnoses     Dyspnea, unspecified type    -  1       Follow-ups after your visit        Future tests that were ordered for you today     Open Future Orders        Priority Expected Expires Ordered    Pulmonary Function Test Routine  9/25/2019 9/25/2018            Who to contact     If you have questions or need follow up information about today's clinic visit or your schedule please contact [facility] directly at [phone].  Normal or non-critical lab and imaging results will be communicated to you by MyChart, letter or phone within 4 business days after the clinic has received the results. If you do not hear from us within 7 days, please contact the clinic through MyChart or phone. If you have a critical or abnormal lab result, we will notify you by phone as soon as possible.  Submit refill requests through [employer] or call your pharmacy and they will forward the refill request to us. Please allow 3 business days for your refill to be completed.          Additional Information About Your Visit        MyChart Information     [employer] lets you send messages to your doctor, view your test results, renew your prescriptions, schedule appointments and more. To sign up, go to [email or web address] . Click on \"Log in\" on the left side of the screen, which will take you to the Welcome page. Then click on \"Sign up Now\" on the right side of the page.     You will be asked to enter the access code listed below, as well as some personal information. Please follow the directions to create your username and password.     Your access code is: [email or web address]  Expires: 12/24/2018  6:30 AM     Your access code will " " in 90 days. If you need help or a new code, please call your [place] clinic or [phone].        Care EveryWhere ID     This is your Care EveryWhere ID. This could be used by other organizations to access your [place] medical records  [id number]        Your Vitals Were     Pulse Temperature Respirations Height Last Period Pulse Oximetry    77 98.1  F (36.7  C) (Tympanic) 18 1.651 m (5' 5\") 2013 99%    BMI (Body Mass Index)                   19.82 kg/m2            Blood Pressure from Last 3 Encounters:   18 126/43   18 92/60   17 92/50    Weight from Last 3 Encounters:   18 54 kg (119 lb 1.6 oz)   18 54.4 kg (120 lb)   17 53.2 kg (117 lb 3.2 oz)              Today, you had the following     No orders found for display       Primary Care Provider Office Phone # Fax #    Vijay Ruiz [phone]       [address] [place] [address]        Equal Access to Services     [facility]: Hadii aad ku hadasho Sojose manuel, waaxda luqadaha, qaybta kaalmada adenolviayada, salvatore wilson . So [facility] [phone].    ATENCIÓN: Si habla español, tiene a ring disposición servicios gratuitos de asistencia lingüística. Llame al [phone].    We comply with applicable federal civil rights laws and [place] laws. We do not discriminate on the basis of race, color, national origin, age, disability, sex, sexual orientation, or gender identity.            Thank you!     Thank you for choosing [facility]  for your care. Our goal is always to provide you with excellent care. Hearing back from our patients is one way we can continue to improve our services. Please take a few minutes to complete the written survey that you may receive in the mail after your visit with us. Thank you!             Your Updated Medication List - Protect others around you: Learn how to safely use, store and throw away your medicines at " www.disposemymeds.org.          This list is accurate as of 9/26/18  8:33 AM.  Always use your most recent med list.                   Brand Name Dispense Instructions for use Diagnosis    albuterol 108 (90 Base) MCG/ACT inhaler    PROAIR HFA    1 Inhaler    Inhale 2 puffs into the lungs every 4 hours as needed for shortness of breath / dyspnea    Mild intermittent asthma, unspecified whether complicated, Acute seasonal allergic rhinitis due to other allergen       ciprofloxacin 250 MG tablet    CIPRO    6 tablet    Take 1 tablet (250 mg) by mouth 2 times daily    Dysuria       ibuprofen 200 MG tablet    ADVIL/MOTRIN    100 tablet    Take 1 tablet by mouth every 4 hours as needed for pain.        loratadine 10 MG tablet    CLARITIN     Take 10 mg by mouth daily        PROBIOTIC DAILY PO           VITAMIN D (CHOLECALCIFEROL) PO      Take 5,000 Units by mouth daily

## 2018-09-26 NOTE — PROGRESS NOTES
INTERVENTIONAL PULMONARY & NODULE CLINIC       Reason for Consultation: lung nodule(s) and dyspnea    Requesting Physician: Ronnie Hillman MD  [address]  [place] [address]       Assessment and Plan:  1. Established multiple pulmonary lung nodule(s). Given the characteristics on current/previous imaging and risk factors; I would classify this to be Low (<6%) risk for cancer. No need for further CT chest for nodules    2. Dyspnea: sometimes on exertion, other times wakes up w heaviness in her chest, vague chest pain. But no other symptoms. Able to exercise with a  w no symptoms. Suggested to see her primary and have w/u for cardiac reasons    3.Hx of asthma but no recent exacerbation    Billing: The patient was seen and examined by me and the findings, assessment, and plan as documented was explained to the patient/family who expressed understand.       STEPHANIE Dorado MD    History:  Jie Siddiqui is a 48 year old female with sig h/o for pulm nodules who is here for evaluation/followup of lung nodule(s) and dyspnea.    - Dyspnea, inconsistent occurrence in terms of exertion, sometimes wakes up with it.  - Also describes vague chest pain, again not on exertion consistently  - Personal hx of cancer: No  - Family hx of cancer: yes, paternal grandmother  - Exposure hx: Denies asbestos or radon exposure   - Tobacco hx: Never smoker   - My interpretation of the images relevant for this visit includes: CT chest, stable pulm nodules, largest 7 mm   - My interpretation of the PFT's relevant for this visit includes: Normal     Culprit Nodule(s):   Multiple bilateral lung nodule(s) that are sub 8 mm. First seen by chest CT on 5/19/14. There is no interval change    Other active medical problems include:   - None   Past Medical History:     Past Medical History:   Diagnosis Date     Asthma lifetime     Dysmenorrhea      Endometriosis 2000     Polycystic ovarian disease (PCOD) 2000        Past  Surgical History:  Past Surgical History:   Procedure Laterality Date     C NONSPECIFIC PROCEDURE  2001, 2004    C/S x2   4/21/01, 1/08/2004     C NONSPECIFIC PROCEDURE  2000    Laparoscopy (endometriosis)  Abstracted 07/16/02     C NONSPECIFIC PROCEDURE  2005    breast augmentation     ECTOPIC PREGNANCY SURGERY  2003    cyst noted in abdomen not tubes     LAPAROSCOPIC HYSTERECTOMY TOTAL, BILATERAL SALPINGO-OOPHORECTOMY, COMBINED  11/26/2013    Procedure: COMBINED LAPAROSCOPIC HYSTERECTOMY TOTAL, SALPINGO-OOPHORECTOMY;  LAPAROSCOPIC HYSTERECTOMY   BILATERAL SALPINGECTOMY   and cystoscopy;  Surgeon: Vijay Ruiz MD;  Location:  OR       Social History:  Social History   Substance Use Topics     Smoking status: Never Smoker     Smokeless tobacco: Never Used     Alcohol use 0.0 oz/week     0 Standard drinks or equivalent per week      Comment: occasional wine 1x weekly       Family History:  Family History   Problem Relation Age of Onset     Lipids Father      Cancer Paternal Grandmother      breast cancer     Cerebrovascular Disease Paternal Grandmother      Lipids Paternal Grandmother      Psychotic Disorder Maternal Aunt      bi polar     Asthma No family hx of      C.A.D. No family hx of      Diabetes No family hx of      Hypertension No family hx of      Breast Cancer No family hx of      Cancer - colorectal No family hx of      Prostate Cancer No family hx of      Alcohol/Drug No family hx of      Allergies No family hx of      Alzheimer Disease No family hx of      Anesthesia Reaction No family hx of      Arthritis No family hx of      Blood Disease No family hx of      Cardiovascular No family hx of           Allergies:     Allergies   Allergen Reactions     Erythromycin      hives     Morphine      Hives         Medications:  Reviewed    ROS:  Performed 10 points and all unremarkable except those mentioned in HPI    Physical exam:  Temp:  [98.1  F (36.7  C)] 98.1  F (36.7  C)  Pulse:  [77] 77  Resp:   [18] 18  BP: (126)/(43) 126/43  SpO2:  [99 %] 99 %  Wt Readings from Last 4 Encounters:   09/26/18 54 kg (119 lb 1.6 oz)   06/11/18 54.4 kg (120 lb)   05/01/17 53.2 kg (117 lb 3.2 oz)   04/25/16 52.6 kg (116 lb)     Constitutional:   Awake, alert and in no apparent distress   Eyes:   Nonicteric, FINA   ENT:    Trachea is midline. No gross neck abnormalities    Neck:   Supple without supraclavicular or cervical lymphadenopathy   Lungs:   Good air flow.  No crackles. No rhonchi.  No wheezes.   Cardiovascular:   Normal S1 and S2.  RRR.  No murmur, gallop or rub.  Radial, DP and PT pulses normal and symmetric   Abdomen:   NABS, soft, nontender, nondistended.  No HSM.   Musculoskeletal:   No edema.    Neurologic:   Alert and conversant. Cranial nerves  intact.     Skin:   Warm, dry.  No rash on limited exam.      Data:  All laboratory and imaging data reviewed.    Results for orders placed or performed in visit on 09/26/18 (from the past 24 hour(s))   General PFT Lab (Please always keep checked)   Result Value Ref Range    FVC-Pred 3.62 L    FVC-Pre 3.59 L    FVC-%Pred-Pre 99 %    FEV1-Pre 2.55 L    FEV1-%Pred-Pre 87 %    FEV1FVC-Pred 80 %    FEV1FVC-Pre 71 %    FEFMax-Pred 6.94 L/sec    FEFMax-Pre 6.87 L/sec    FEFMax-%Pred-Pre 99 %    FEF2575-Pred 2.86 L/sec    FEF2575-Pre 1.61 L/sec    YBE4965-%Pred-Pre 56 %    FEF2575-Post 2.13 L/sec    TGT4312-%Pred-Post 74 %    ExpTime-Pre 8.52 sec    FIFMax-Pre 4.93 L/sec    VC-Pred 3.64 L    VC-Pre 3.61 L    VC-%Pred-Pre 99 %    IC-Pred 2.37 L    IC-Pre 2.21 L    IC-%Pred-Pre 93 %    ERV-Pred 1.27 L    ERV-Pre 1.40 L    ERV-%Pred-Pre 110 %    FEV1FEV6-Pred 83 %    FEV1FEV6-Pre 72 %    FRCPleth-Pred 2.75 L    FRCPleth-Pre 3.77 L    FRCPleth-%Pred-Pre 137 %    RVPleth-Pred 1.77 L    RVPleth-Pre 2.37 L    RVPleth-%Pred-Pre 133 %    TLCPleth-Pred 5.11 L    TLCPleth-Pre 5.97 L    TLCPleth-%Pred-Pre 116 %    DLCOunc-Pred 23.30 ml/min/mmHg    DLCOunc-Pre 22.10 ml/min/mmHg     DLCOunc-%Pred-Pre 94 %    VA-Pre 4.99 L    VA-%Pred-Pre 95 %    FEV1SVC-Pred 80 %    FEV1SVC-Pre 71 %       Previous PFTs:  FVC-Pred   Date Value Ref Range Status   09/26/2018 3.62 L      FVC-Pre   Date Value Ref Range Status   09/26/2018 3.59 L      FVC-%Pred-Pre   Date Value Ref Range Status   09/26/2018 99 %      FEV1-Pre   Date Value Ref Range Status   09/26/2018 2.55 L      FEV1-%Pred-Pre   Date Value Ref Range Status   09/26/2018 87 %      FEV1FVC-Pred   Date Value Ref Range Status   09/26/2018 80 %      FEV1FVC-Pre   Date Value Ref Range Status   09/26/2018 71 %      No results found for: 26397  FEFMax-Pred   Date Value Ref Range Status   09/26/2018 6.94 L/sec      FEFMax-Pre   Date Value Ref Range Status   09/26/2018 6.87 L/sec      FEFMax-%Pred-Pre   Date Value Ref Range Status   09/26/2018 99 %      ExpTime-Pre   Date Value Ref Range Status   09/26/2018 8.52 sec      FIFMax-Pre   Date Value Ref Range Status   09/26/2018 4.93 L/sec      FEV1FEV6-Pred   Date Value Ref Range Status   09/26/2018 83 %      FEV1FEV6-Pre   Date Value Ref Range Status   09/26/2018 72 %      Cardiology:  EKG, 2013 with no ischemic changes

## 2018-09-26 NOTE — LETTER
9/26/2018       RE: Jie Siddiqui  [address] [facility] [address]     Dear Colleague,    Thank you for referring your patient, Jie Siddiqui, to the [facility] at [facility]. Please see a copy of my visit note below.        INTERVENTIONAL PULMONARY & NODULE CLINIC       Reason for Consultation: lung nodule(s) and dyspnea    Requesting Physician: Ronnie Hillman MD  [address]  [place] [address]       Assessment and Plan:  1. Established multiple pulmonary lung nodule(s). Given the characteristics on current/previous imaging and risk factors; I would classify this to be Low (<6%) risk for cancer. No need for further CT chest for nodules    2. Dyspnea: sometimes on exertion, other times wakes up w heaviness in her chest, vague chest pain. But no other symptoms. Able to exercise with a  w no symptoms. Suggested to see her primary and have w/u for cardiac reasons    3.Hx of asthma but no recent exacerbation    Billing: The patient was seen and examined by me and the findings, assessment, and plan as documented was explained to the patient/family who expressed understand.       STEPHANIE Dorado MD    History:  Jie Siddiqui is a 48 year old female with sig h/o for pulm nodules who is here for evaluation/followup of lung nodule(s) and dyspnea.    - Dyspnea, inconsistent occurrence in terms of exertion, sometimes wakes up with it.  - Also describes vague chest pain, again not on exertion consistently  - Personal hx of cancer: No  - Family hx of cancer: yes, paternal grandmother  - Exposure hx: Denies asbestos or radon exposure   - Tobacco hx: Never smoker   - My interpretation of the images relevant for this visit includes: CT chest, stable pulm nodules, largest 7 mm   - My interpretation of the PFT's relevant for this visit includes: Normal     Culprit Nodule(s):   Multiple bilateral lung nodule(s) that are sub 8 mm.  First seen by chest CT on 5/19/14. There is no interval change    Other active medical problems include:   - None   Past Medical History:     Past Medical History:   Diagnosis Date     Asthma lifetime     Dysmenorrhea      Endometriosis 2000     Polycystic ovarian disease (PCOD) 2000        Past Surgical History:  Past Surgical History:   Procedure Laterality Date     C NONSPECIFIC PROCEDURE  2001, 2004    C/S x2   4/21/01, 1/08/2004     C NONSPECIFIC PROCEDURE  2000    Laparoscopy (endometriosis)  Abstracted 07/16/02     C NONSPECIFIC PROCEDURE  2005    breast augmentation     ECTOPIC PREGNANCY SURGERY  2003    cyst noted in abdomen not tubes     LAPAROSCOPIC HYSTERECTOMY TOTAL, BILATERAL SALPINGO-OOPHORECTOMY, COMBINED  11/26/2013    Procedure: COMBINED LAPAROSCOPIC HYSTERECTOMY TOTAL, SALPINGO-OOPHORECTOMY;  LAPAROSCOPIC HYSTERECTOMY   BILATERAL SALPINGECTOMY   and cystoscopy;  Surgeon: Vijay Ruiz MD;  Location: [facility] OR       Social History:  Social History   Substance Use Topics     Smoking status: Never Smoker     Smokeless tobacco: Never Used     Alcohol use 0.0 oz/week     0 Standard drinks or equivalent per week      Comment: occasional wine 1x weekly       Family History:  Family History   Problem Relation Age of Onset     Lipids Father      Cancer Paternal Grandmother      breast cancer     Cerebrovascular Disease Paternal Grandmother      Lipids Paternal Grandmother      Psychotic Disorder Maternal Aunt      bi polar     Asthma No family hx of      C.A.D. No family hx of      Diabetes No family hx of      Hypertension No family hx of      Breast Cancer No family hx of      Cancer - colorectal No family hx of      Prostate Cancer No family hx of      Alcohol/Drug No family hx of      Allergies No family hx of      Alzheimer Disease No family hx of      Anesthesia Reaction No family hx of      Arthritis No family hx of      Blood Disease No family hx of      Cardiovascular No family hx of            Allergies:     Allergies   Allergen Reactions     Erythromycin      hives     Morphine      Hives         Medications:  Reviewed    ROS:  Performed 10 points and all unremarkable except those mentioned in HPI    Physical exam:  Temp:  [98.1  F (36.7  C)] 98.1  F (36.7  C)  Pulse:  [77] 77  Resp:  [18] 18  BP: (126)/(43) 126/43  SpO2:  [99 %] 99 %  Wt Readings from Last 4 Encounters:   09/26/18 54 kg (119 lb 1.6 oz)   06/11/18 54.4 kg (120 lb)   05/01/17 53.2 kg (117 lb 3.2 oz)   04/25/16 52.6 kg (116 lb)     Constitutional:   Awake, alert and in no apparent distress   Eyes:   Nonicteric, FINA   ENT:    Trachea is midline. No gross neck abnormalities    Neck:   Supple without supraclavicular or cervical lymphadenopathy   Lungs:   Good air flow.  No crackles. No rhonchi.  No wheezes.   Cardiovascular:   Normal S1 and S2.  RRR.  No murmur, gallop or rub.  Radial, DP and PT pulses normal and symmetric   Abdomen:   NABS, soft, nontender, nondistended.  No HSM.   Musculoskeletal:   No edema.    Neurologic:   Alert and conversant. Cranial nerves  intact.     Skin:   Warm, dry.  No rash on limited exam.      Data:  All laboratory and imaging data reviewed.    Results for orders placed or performed in visit on 09/26/18 (from the past 24 hour(s))   General PFT Lab (Please always keep checked)   Result Value Ref Range    FVC-Pred 3.62 L    FVC-Pre 3.59 L    FVC-%Pred-Pre 99 %    FEV1-Pre 2.55 L    FEV1-%Pred-Pre 87 %    FEV1FVC-Pred 80 %    FEV1FVC-Pre 71 %    FEFMax-Pred 6.94 L/sec    FEFMax-Pre 6.87 L/sec    FEFMax-%Pred-Pre 99 %    FEF2575-Pred 2.86 L/sec    FEF2575-Pre 1.61 L/sec    FWR7677-%Pred-Pre 56 %    FEF2575-Post 2.13 L/sec    SIR5322-%Pred-Post 74 %    ExpTime-Pre 8.52 sec    FIFMax-Pre 4.93 L/sec    VC-Pred 3.64 L    VC-Pre 3.61 L    VC-%Pred-Pre 99 %    IC-Pred 2.37 L    IC-Pre 2.21 L    IC-%Pred-Pre 93 %    ERV-Pred 1.27 L    ERV-Pre 1.40 L    ERV-%Pred-Pre 110 %    FEV1FEV6-Pred 83 %    FEV1FEV6-Pre 72  %    FRCPleth-Pred 2.75 L    FRCPleth-Pre 3.77 L    FRCPleth-%Pred-Pre 137 %    RVPleth-Pred 1.77 L    RVPleth-Pre 2.37 L    RVPleth-%Pred-Pre 133 %    TLCPleth-Pred 5.11 L    TLCPleth-Pre 5.97 L    TLCPleth-%Pred-Pre 116 %    DLCOunc-Pred 23.30 ml/min/mmHg    DLCOunc-Pre 22.10 ml/min/mmHg    DLCOunc-%Pred-Pre 94 %    VA-Pre 4.99 L    VA-%Pred-Pre 95 %    FEV1SVC-Pred 80 %    FEV1SVC-Pre 71 %       Previous PFTs:  FVC-Pred   Date Value Ref Range Status   09/26/2018 3.62 L      FVC-Pre   Date Value Ref Range Status   09/26/2018 3.59 L      FVC-%Pred-Pre   Date Value Ref Range Status   09/26/2018 99 %      FEV1-Pre   Date Value Ref Range Status   09/26/2018 2.55 L      FEV1-%Pred-Pre   Date Value Ref Range Status   09/26/2018 87 %      FEV1FVC-Pred   Date Value Ref Range Status   09/26/2018 80 %      FEV1FVC-Pre   Date Value Ref Range Status   09/26/2018 71 %      No results found for: 78476  FEFMax-Pred   Date Value Ref Range Status   09/26/2018 6.94 L/sec      FEFMax-Pre   Date Value Ref Range Status   09/26/2018 6.87 L/sec      FEFMax-%Pred-Pre   Date Value Ref Range Status   09/26/2018 99 %      ExpTime-Pre   Date Value Ref Range Status   09/26/2018 8.52 sec      FIFMax-Pre   Date Value Ref Range Status   09/26/2018 4.93 L/sec      FEV1FEV6-Pred   Date Value Ref Range Status   09/26/2018 83 %      FEV1FEV6-Pre   Date Value Ref Range Status   09/26/2018 72 %      Cardiology:  EKG, 2013 with no ischemic changes             Again, thank you for allowing me to participate in the care of your patient.      Sincerely,    Mendez Dorado MD

## 2018-10-08 LAB
DLCOUNC-%PRED-PRE: 94 %
DLCOUNC-PRE: 22.1 ML/MIN/MMHG
DLCOUNC-PRED: 23.3 ML/MIN/MMHG
ERV-%PRED-PRE: 110 %
ERV-PRE: 1.4 L
ERV-PRED: 1.27 L
EXPTIME-PRE: 8.52 SEC
FEF2575-%PRED-POST: 74 %
FEF2575-%PRED-PRE: 56 %
FEF2575-POST: 2.13 L/SEC
FEF2575-PRE: 1.61 L/SEC
FEF2575-PRED: 2.86 L/SEC
FEFMAX-%PRED-PRE: 99 %
FEFMAX-PRE: 6.87 L/SEC
FEFMAX-PRED: 6.94 L/SEC
FEV1-%PRED-PRE: 87 %
FEV1-PRE: 2.55 L
FEV1FEV6-PRE: 72 %
FEV1FEV6-PRED: 83 %
FEV1FVC-PRE: 71 %
FEV1FVC-PRED: 80 %
FEV1SVC-PRE: 71 %
FEV1SVC-PRED: 80 %
FIFMAX-PRE: 4.93 L/SEC
FRCPLETH-%PRED-PRE: 137 %
FRCPLETH-PRE: 3.77 L
FRCPLETH-PRED: 2.75 L
FVC-%PRED-PRE: 99 %
FVC-PRE: 3.59 L
FVC-PRED: 3.62 L
IC-%PRED-PRE: 93 %
IC-PRE: 2.21 L
IC-PRED: 2.37 L
RVPLETH-%PRED-PRE: 133 %
RVPLETH-PRE: 2.37 L
RVPLETH-PRED: 1.77 L
TLCPLETH-%PRED-PRE: 116 %
TLCPLETH-PRE: 5.97 L
TLCPLETH-PRED: 5.11 L
VA-%PRED-PRE: 95 %
VA-PRE: 4.99 L
VC-%PRED-PRE: 99 %
VC-PRE: 3.61 L
VC-PRED: 3.64 L

## 2018-11-11 DIAGNOSIS — J45.20 MILD INTERMITTENT ASTHMA, UNSPECIFIED WHETHER COMPLICATED: ICD-10-CM

## 2018-11-12 RX ORDER — ALBUTEROL SULFATE 90 UG/1
AEROSOL, METERED RESPIRATORY (INHALATION)
Qty: 8.5 G | Refills: 1 | Status: SHIPPED | OUTPATIENT
Start: 2018-11-12 | End: 2018-11-13

## 2018-11-12 NOTE — TELEPHONE ENCOUNTER
Prescription approved per [facility] Refill Protocol.  Renée Iglesias RN

## 2018-11-13 ENCOUNTER — OFFICE VISIT (OUTPATIENT)
Dept: INTERNAL MEDICINE | Facility: CLINIC | Age: 48
End: 2018-11-13
Payer: COMMERCIAL

## 2018-11-13 ENCOUNTER — OFFICE VISIT (OUTPATIENT)
Dept: BEHAVIORAL HEALTH | Facility: CLINIC | Age: 48
End: 2018-11-13
Payer: COMMERCIAL

## 2018-11-13 VITALS
HEIGHT: 65 IN | TEMPERATURE: 98.5 F | OXYGEN SATURATION: 99 % | WEIGHT: 117 LBS | HEART RATE: 90 BPM | DIASTOLIC BLOOD PRESSURE: 68 MMHG | BODY MASS INDEX: 19.49 KG/M2 | SYSTOLIC BLOOD PRESSURE: 120 MMHG

## 2018-11-13 DIAGNOSIS — R06.02 SOB (SHORTNESS OF BREATH): Primary | ICD-10-CM

## 2018-11-13 DIAGNOSIS — F41.9 ANXIETY: ICD-10-CM

## 2018-11-13 DIAGNOSIS — F41.1 GAD (GENERALIZED ANXIETY DISORDER): ICD-10-CM

## 2018-11-13 DIAGNOSIS — R07.89 ATYPICAL CHEST PAIN: ICD-10-CM

## 2018-11-13 DIAGNOSIS — J45.20 MILD INTERMITTENT ASTHMA, UNSPECIFIED WHETHER COMPLICATED: ICD-10-CM

## 2018-11-13 LAB
BASOPHILS # BLD AUTO: 0.1 10E9/L (ref 0–0.2)
BASOPHILS NFR BLD AUTO: 0.6 %
DIFFERENTIAL METHOD BLD: NORMAL
EOSINOPHIL # BLD AUTO: 0.6 10E9/L (ref 0–0.7)
EOSINOPHIL NFR BLD AUTO: 6.4 %
ERYTHROCYTE [DISTWIDTH] IN BLOOD BY AUTOMATED COUNT: 12.7 % (ref 10–15)
HCT VFR BLD AUTO: 39.9 % (ref 35–47)
HGB BLD-MCNC: 14 G/DL (ref 11.7–15.7)
LYMPHOCYTES # BLD AUTO: 3.2 10E9/L (ref 0.8–5.3)
LYMPHOCYTES NFR BLD AUTO: 35.7 %
MCH RBC QN AUTO: 32.5 PG (ref 26.5–33)
MCHC RBC AUTO-ENTMCNC: 35.1 G/DL (ref 31.5–36.5)
MCV RBC AUTO: 93 FL (ref 78–100)
MONOCYTES # BLD AUTO: 0.6 10E9/L (ref 0–1.3)
MONOCYTES NFR BLD AUTO: 6.9 %
NEUTROPHILS # BLD AUTO: 4.6 10E9/L (ref 1.6–8.3)
NEUTROPHILS NFR BLD AUTO: 50.4 %
PLATELET # BLD AUTO: 202 10E9/L (ref 150–450)
RBC # BLD AUTO: 4.31 10E12/L (ref 3.8–5.2)
WBC # BLD AUTO: 9 10E9/L (ref 4–11)

## 2018-11-13 PROCEDURE — 99203 OFFICE O/P NEW LOW 30 MIN: CPT | Performed by: INTERNAL MEDICINE

## 2018-11-13 PROCEDURE — 80053 COMPREHEN METABOLIC PANEL: CPT | Performed by: INTERNAL MEDICINE

## 2018-11-13 PROCEDURE — 99207 ZZC NO CHARGE BEHAVIORAL WARM HANDOFF: CPT | Performed by: MARRIAGE & FAMILY THERAPIST

## 2018-11-13 PROCEDURE — 85025 COMPLETE CBC W/AUTO DIFF WBC: CPT | Performed by: INTERNAL MEDICINE

## 2018-11-13 PROCEDURE — 36415 COLL VENOUS BLD VENIPUNCTURE: CPT | Performed by: INTERNAL MEDICINE

## 2018-11-13 PROCEDURE — 93000 ELECTROCARDIOGRAM COMPLETE: CPT | Performed by: INTERNAL MEDICINE

## 2018-11-13 RX ORDER — HYDROXYZINE HYDROCHLORIDE 25 MG/1
25-50 TABLET, FILM COATED ORAL EVERY 6 HOURS PRN
Qty: 60 TABLET | Refills: 0 | Status: SHIPPED | OUTPATIENT
Start: 2018-11-13 | End: 2019-12-31

## 2018-11-13 RX ORDER — ALBUTEROL SULFATE 90 UG/1
AEROSOL, METERED RESPIRATORY (INHALATION)
Qty: 8.5 G | Refills: 11 | Status: SHIPPED | OUTPATIENT
Start: 2018-11-13 | End: 2019-12-08

## 2018-11-13 RX ORDER — HYDROXYZINE HYDROCHLORIDE 25 MG/1
25-50 TABLET, FILM COATED ORAL EVERY 6 HOURS PRN
Qty: 60 TABLET | Refills: 0 | Status: SHIPPED | OUTPATIENT
Start: 2018-11-13 | End: 2018-11-13

## 2018-11-13 RX ORDER — BUDESONIDE AND FORMOTEROL FUMARATE DIHYDRATE 80; 4.5 UG/1; UG/1
2 AEROSOL RESPIRATORY (INHALATION) 2 TIMES DAILY
Qty: 3 INHALER | Refills: 1 | Status: SHIPPED | OUTPATIENT
Start: 2018-11-13 | End: 2019-12-08

## 2018-11-13 ASSESSMENT — ANXIETY QUESTIONNAIRES
IF YOU CHECKED OFF ANY PROBLEMS ON THIS QUESTIONNAIRE, HOW DIFFICULT HAVE THESE PROBLEMS MADE IT FOR YOU TO DO YOUR WORK, TAKE CARE OF THINGS AT HOME, OR GET ALONG WITH OTHER PEOPLE: VERY DIFFICULT
1. FEELING NERVOUS, ANXIOUS, OR ON EDGE: NEARLY EVERY DAY
GAD7 TOTAL SCORE: 20
2. NOT BEING ABLE TO STOP OR CONTROL WORRYING: SEVERAL DAYS
5. BEING SO RESTLESS THAT IT IS HARD TO SIT STILL: NEARLY EVERY DAY
7. FEELING AFRAID AS IF SOMETHING AWFUL MIGHT HAPPEN: SEVERAL DAYS
1. FEELING NERVOUS, ANXIOUS, OR ON EDGE: SEVERAL DAYS
6. BECOMING EASILY ANNOYED OR IRRITABLE: NOT AT ALL
3. WORRYING TOO MUCH ABOUT DIFFERENT THINGS: SEVERAL DAYS
5. BEING SO RESTLESS THAT IT IS HARD TO SIT STILL: NOT AT ALL
GAD7 TOTAL SCORE: 5
3. WORRYING TOO MUCH ABOUT DIFFERENT THINGS: NEARLY EVERY DAY
IF YOU CHECKED OFF ANY PROBLEMS ON THIS QUESTIONNAIRE, HOW DIFFICULT HAVE THESE PROBLEMS MADE IT FOR YOU TO DO YOUR WORK, TAKE CARE OF THINGS AT HOME, OR GET ALONG WITH OTHER PEOPLE: SOMEWHAT DIFFICULT
7. FEELING AFRAID AS IF SOMETHING AWFUL MIGHT HAPPEN: NEARLY EVERY DAY
6. BECOMING EASILY ANNOYED OR IRRITABLE: MORE THAN HALF THE DAYS
2. NOT BEING ABLE TO STOP OR CONTROL WORRYING: NEARLY EVERY DAY

## 2018-11-13 ASSESSMENT — PATIENT HEALTH QUESTIONNAIRE - PHQ9
5. POOR APPETITE OR OVEREATING: NEARLY EVERY DAY
SUM OF ALL RESPONSES TO PHQ QUESTIONS 1-9: 13
5. POOR APPETITE OR OVEREATING: SEVERAL DAYS

## 2018-11-13 NOTE — PATIENT INSTRUCTIONS
Shortness of breath- check labs for anemia  EKG today  Order a stress test    Can be anxiety    Lindsay    Follow up in 1-2 months

## 2018-11-13 NOTE — MR AVS SNAPSHOT
After Visit Summary   11/13/2018    Jie Siddiqui    MRN: [id number]           Patient Information     Date Of Birth          1970        Visit Information        Provider Department      11/13/2018 1:30 PM Lindsay Callejas LMFT [facility]        Today's Diagnoses     Anxiety           Follow-ups after your visit        Future tests that were ordered for you today     Open Future Orders        Priority Expected Expires Ordered    Exercise Stress Echocardiogram Routine  11/13/2019 11/13/2018            Who to contact     If you have questions or need follow up information about today's clinic visit or your schedule please contact [facility] directly at [phone].  Normal or non-critical lab and imaging results will be communicated to you by MyChart, letter or phone within 4 business days after the clinic has received the results. If you do not hear from us within 7 days, please contact the clinic through MyChart or phone. If you have a critical or abnormal lab result, we will notify you by phone as soon as possible.  Submit refill requests through [employer] or call your pharmacy and they will forward the refill request to us. Please allow 3 business days for your refill to be completed.          Additional Information About Your Visit        Care EveryWhere ID     This is your Care EveryWhere ID. This could be used by other organizations to access your [place] medical records  [id number]        Your Vitals Were     Last Period                   11/22/2013            Blood Pressure from Last 3 Encounters:   11/13/18 120/68   09/26/18 126/43   06/11/18 92/60    Weight from Last 3 Encounters:   11/13/18 53.1 kg (117 lb)   09/26/18 54 kg (119 lb 1.6 oz)   06/11/18 54.4 kg (120 lb)              Today, you had the following     No orders found for display         Today's Medication Changes          These changes are accurate as of 11/13/18  2:51 PM.  If you have  any questions, ask your nurse or doctor.               Start taking these medicines.        Dose/Directions    budesonide-formoterol 80-4.5 MCG/ACT Inhaler   Commonly known as:  SYMBICORT   Used for:  Mild intermittent asthma, unspecified whether complicated   Started by:  Celia Gordon MD        Dose:  2 puff   Inhale 2 puffs into the lungs 2 times daily   Quantity:  3 Inhaler   Refills:  1       hydrOXYzine 25 MG tablet   Commonly known as:  ATARAX   Used for:  LUCIANO (generalized anxiety disorder)   Started by:  Celia Gordon MD        Dose:  25-50 mg   Take 1-2 tablets (25-50 mg) by mouth every 6 hours as needed for anxiety   Quantity:  60 tablet   Refills:  0            Where to get your medicines      These medications were sent to [facility] [address] [facility] [address] [facility] AT [address] [address] [facility] [address]     Phone:  [phone]     albuterol 108 (90 Base) MCG/ACT inhaler    budesonide-formoterol 80-4.5 MCG/ACT Inhaler    hydrOXYzine 25 MG tablet                Primary Care Provider Office Phone # Fax #    Vijay Ruiz [phone]       [address] [facility] [address]        Equal Access to Services     ЮЛИЯ LANDRUM AH: Hadii aad ku hadasho Soomaali, waaxda luqadaha, qaybta kaalmada adeegyada, waxay idiin hayatifn cinthia bai. So [facility] [phone].    ATENCIÓN: Si habla español, tiene a ring disposición servicios gratuitos de asistencia lingüística. [facility] [phone].    We comply with applicable federal civil rights laws and [place] laws. We do not discriminate on the basis of race, color, national origin, age, disability, sex, sexual orientation, or gender identity.            Thank you!     Thank you for choosing [facility]  for your care. Our goal is always to provide you with excellent care. Hearing back from our patients is one way we can continue to improve our services.  Please take a few minutes to complete the written survey that you may receive in the mail after your visit with us. Thank you!             Your Updated Medication List - Protect others around you: Learn how to safely use, store and throw away your medicines at www.disposemymeds.org.          This list is accurate as of 11/13/18  2:51 PM.  Always use your most recent med list.                   Brand Name Dispense Instructions for use Diagnosis    albuterol 108 (90 Base) MCG/ACT inhaler    PROAIR HFA    8.5 g    INHALE 2 PUFFS INTO THE LUNGS EVERY 4 HOURS AS NEEDED FOR SHORTNESS OF BREATH/ DYSPNEA    Mild intermittent asthma, unspecified whether complicated       budesonide-formoterol 80-4.5 MCG/ACT Inhaler    SYMBICORT    3 Inhaler    Inhale 2 puffs into the lungs 2 times daily    Mild intermittent asthma, unspecified whether complicated       hydrOXYzine 25 MG tablet    ATARAX    60 tablet    Take 1-2 tablets (25-50 mg) by mouth every 6 hours as needed for anxiety    LUCIANO (generalized anxiety disorder)       ibuprofen 200 MG tablet    ADVIL/MOTRIN    100 tablet    Take 1 tablet by mouth every 4 hours as needed for pain.        loratadine 10 MG tablet    CLARITIN     Take 10 mg by mouth daily        PROBIOTIC DAILY PO           VITAMIN D (CHOLECALCIFEROL) PO      Take 5,000 Units by mouth daily

## 2018-11-13 NOTE — PROGRESS NOTES
Patient had appointment with his/her primary care physician. [facility] services were requested / offered. No immediate safety/risk issues were reported or identified.  Explained the role of the [facility] and provided informational handout and contact information for the [facility].     Lindsay Callejas, Behavioral Health Clinician

## 2018-11-13 NOTE — PROGRESS NOTES
"  SUBJECTIVE:   Jie Siddiqui is a 48 year old female who presents to clinic today for the following health issues:      Shortness of breath.  She was diagnosed with asthma when she was 3 years old.  In college and adult new she had felt fine.  She did not need inhalers at that time.  1.5 years ago approximately she felt like she could not catch her breath.  She had episodes lasting 20 minutes.  She has a heavy pressure on her chest.    She would have symptoms with activity usually, but sometimes while sitting.  The patient also coughs at times.  She denies any heartburn.  No change in environment.  Same work place.  [place].  No change in home environment.   She reports that her anxiety is elevated.  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  She has a 14 year old and 17 year old.   She had a LUCIANO score of 20 today.    She does not see a counselor.   Denies any leg swelling.      Does not smoke.    No HTN.   No HL.   No family history of heart disease.     Asthma Follow-Up      Was ACT completed today?    Yes    ACT Total Scores 8/7/2014   ACT TOTAL SCORE 25   ASTHMA ER VISITS 0 = None   ASTHMA HOSPITALIZATIONS 0 = None       Recent asthma triggers that patient is dealing with: exercise            Amount of exercise or physical activity: 2-3 days/week for an average of 45-60 minutes    Problems taking medications regularly: No    Medication side effects: none    Diet: regular (no restrictions)          Problem list and histories reviewed & adjusted, as indicated.    Reviewed and updated as needed this visit by clinical staff  Tobacco  Allergies  Meds  Med Hx  Surg Hx  Fam Hx  Soc Hx      Reviewed and updated as needed this visit by Provider         ROS:  CONSTITUTIONAL: NEGATIVE for fever, chills, change in weight  RESP: POS SOB and cough  CV: POS palpitations; NEG chest pain    OBJECTIVE:     /68  Pulse 90  Temp 98.5  F (36.9  C) (Oral)  Ht 5' 5\" (1.651 m)  Wt 117 lb (53.1 kg)  LMP 11/22/2013  " SpO2 99%  Breastfeeding? No  BMI 19.47 kg/m2  Body mass index is 19.47 kg/(m^2).  GENERAL: healthy, alert and no distress  RESP: lungs clear to auscultation - no rales, rhonchi or wheezes  CV: regular rate and rhythm, normal S1 S2, no S3 or S4, no murmur, click or rub  Extr: no edema bilaterally      ASSESSMENT/PLAN:       (R06.02) SOB (shortness of breath)  (primary encounter diagnosis)  Comment: assess for metabolic and cardiac; consider anxiety  Plan: CBC with platelets differential, Comprehensive         metabolic panel, EKG 12-lead complete w/read -         Clinics, Exercise Stress Echocardiogram            (J45.20) Mild intermittent asthma, unspecified whether complicated  Comment: not at goal  Plan: albuterol (PROAIR HFA) 108 (90 Base) MCG/ACT         inhaler, budesonide-formoterol (SYMBICORT)         80-4.5 MCG/ACT Inhaler            (R07.89) Atypical chest pain  Comment:   Plan: Exercise Stress Echocardiogram            (F41.1) LUCIANO (generalized anxiety disorder)  Comment:   Plan: hydrOXYzine (ATARAX) 25 MG tablet,         DISCONTINUED: hydrOXYzine (ATARAX) 25 MG tablet        -pt declines a daily medication for anxiety        -she is interested in counseling        Celia Gordon MD  [facility]

## 2018-11-13 NOTE — LETTER
November 15, 2018      Jie Alvarezlili  [address] [facility] [address]        Dear ,    We are writing to inform you of your test results.    The blood counts are within acceptable limits.   The electrolytes reveal a low potassium.  Recommend repeating this in a lab only appointment in 1-2 weeks.   The kidney function (GFR) is normal.  The liver function tests (AST and ALT) are within normal limits.     Resulted Orders   CBC with platelets differential   Result Value Ref Range    WBC 9.0 4.0 - 11.0 10e9/L    RBC Count 4.31 3.8 - 5.2 10e12/L    Hemoglobin 14.0 11.7 - 15.7 g/dL    Hematocrit 39.9 35.0 - 47.0 %    MCV 93 78 - 100 fl    MCH 32.5 26.5 - 33.0 pg    MCHC 35.1 31.5 - 36.5 g/dL    RDW 12.7 10.0 - 15.0 %    Platelet Count 202 150 - 450 10e9/L    % Neutrophils 50.4 %    % Lymphocytes 35.7 %    % Monocytes 6.9 %    % Eosinophils 6.4 %    % Basophils 0.6 %    Absolute Neutrophil 4.6 1.6 - 8.3 10e9/L    Absolute Lymphocytes 3.2 0.8 - 5.3 10e9/L    Absolute Monocytes 0.6 0.0 - 1.3 10e9/L    Absolute Eosinophils 0.6 0.0 - 0.7 10e9/L    Absolute Basophils 0.1 0.0 - 0.2 10e9/L    Diff Method Automated Method    Comprehensive metabolic panel   Result Value Ref Range    Sodium 139 133 - 144 mmol/L    Potassium 3.2 (L) 3.4 - 5.3 mmol/L    Chloride 108 94 - 109 mmol/L    Carbon Dioxide 23 20 - 32 mmol/L    Anion Gap 8 3 - 14 mmol/L    Glucose 96 70 - 99 mg/dL      Comment:      Non Fasting    Urea Nitrogen 20 7 - 30 mg/dL    Creatinine 0.94 0.52 - 1.04 mg/dL    GFR Estimate 63 >60 mL/min/1.7m2      Comment:      Non  GFR Calc    GFR Estimate If Black 77 >60 mL/min/1.7m2      Comment:       GFR Calc    Calcium 9.0 8.5 - 10.1 mg/dL    Bilirubin Total 0.6 0.2 - 1.3 mg/dL    Albumin 3.8 3.4 - 5.0 g/dL    Protein Total 7.4 6.8 - 8.8 g/dL    Alkaline Phosphatase 48 40 - 150 U/L    ALT 15 0 - 50 U/L    AST 9 0 - 45 U/L       If you have any questions or concerns, please  call the clinic at the number listed above.       Sincerely,        Celia Gordon MD

## 2018-11-13 NOTE — MR AVS SNAPSHOT
"              After Visit Summary   11/13/2018    Jie Siddiqui    MRN: [id number]           Patient Information     Date Of Birth          1970        Visit Information        Provider Department      11/13/2018 1:00 PM Celia Gordon MD [facility]        Today's Diagnoses     SOB (shortness of breath)    -  1    Mild intermittent asthma, unspecified whether complicated        Atypical chest pain        LUCIANO (generalized anxiety disorder)          Care Instructions    Shortness of breath- check labs for anemia  EKG today  Order a stress test    Can be anxiety    Lindsay    Follow up in 1-2 months          Follow-ups after your visit        Future tests that were ordered for you today     Open Future Orders        Priority Expected Expires Ordered    Exercise Stress Echocardiogram Routine  11/13/2019 11/13/2018            Who to contact     If you have questions or need follow up information about today's clinic visit or your schedule please contact [facility] directly at [phone].  Normal or non-critical lab and imaging results will be communicated to you by MyChart, letter or phone within 4 business days after the clinic has received the results. If you do not hear from us within 7 days, please contact the clinic through MyChart or phone. If you have a critical or abnormal lab result, we will notify you by phone as soon as possible.  Submit refill requests through [employer] or call your pharmacy and they will forward the refill request to us. Please allow 3 business days for your refill to be completed.          Additional Information About Your Visit        Care EveryWhere ID     This is your Care EveryWhere ID. This could be used by other organizations to access your [place] medical records  [id number]        Your Vitals Were     Pulse Temperature Height Last Period Pulse Oximetry Breastfeeding?    90 98.5  F (36.9  C) (Oral) 5' 5\" (1.651 m) 11/22/2013 99% No    " BMI (Body Mass Index)                   19.47 kg/m2            Blood Pressure from Last 3 Encounters:   11/13/18 120/68   09/26/18 126/43   06/11/18 92/60    Weight from Last 3 Encounters:   11/13/18 117 lb (53.1 kg)   09/26/18 119 lb 1.6 oz (54 kg)   06/11/18 120 lb (54.4 kg)              We Performed the Following     CBC with platelets differential     Comprehensive metabolic panel     EKG 12-lead complete w/read - Clinics          Today's Medication Changes          These changes are accurate as of 11/13/18  1:49 PM.  If you have any questions, ask your nurse or doctor.               Start taking these medicines.        Dose/Directions    budesonide-formoterol 80-4.5 MCG/ACT Inhaler   Commonly known as:  SYMBICORT   Used for:  Mild intermittent asthma, unspecified whether complicated   Started by:  Celia Gordon MD        Dose:  2 puff   Inhale 2 puffs into the lungs 2 times daily   Quantity:  3 Inhaler   Refills:  1       hydrOXYzine 25 MG tablet   Commonly known as:  ATARAX   Used for:  LUCIANO (generalized anxiety disorder)   Started by:  Celia Gordon MD        Dose:  25-50 mg   Take 1-2 tablets (25-50 mg) by mouth every 6 hours as needed for anxiety   Quantity:  60 tablet   Refills:  0            Where to get your medicines      [facility] [id number] [place] [address] [id number] [place] [address] [address]  [address] [facility] [address]     Phone:  [phone]     albuterol 108 (90 Base) MCG/ACT inhaler    budesonide-formoterol 80-4.5 MCG/ACT Inhaler    hydrOXYzine 25 MG tablet                Primary Care Provider Office Phone # Fax #    Vijay Ruiz [phone]       [address]  [facility] [address]        Equal Access to Services     WILLIE LANDRUM AH: Marilyn Fuentes, waerikda luqadaha, qaybta kaalmasalvatore bettencourt. [facility] [phone].    ATENCIÓN: Si habla  español, tiene a ring disposición servicios gratuitos de asistencia lingüística. [facility] [phone].    We comply with applicable federal civil rights laws and [place] laws. We do not discriminate on the basis of race, color, national origin, age, disability, sex, sexual orientation, or gender identity.            Thank you!     Thank you for choosing [facility]  for your care. Our goal is always to provide you with excellent care. Hearing back from our patients is one way we can continue to improve our services. Please take a few minutes to complete the written survey that you may receive in the mail after your visit with us. Thank you!             Your Updated Medication List - Protect others around you: Learn how to safely use, store and throw away your medicines at www.disposemymeds.org.          This list is accurate as of 11/13/18  1:49 PM.  Always use your most recent med list.                   Brand Name Dispense Instructions for use Diagnosis    albuterol 108 (90 Base) MCG/ACT inhaler    PROAIR HFA    8.5 g    INHALE 2 PUFFS INTO THE LUNGS EVERY 4 HOURS AS NEEDED FOR SHORTNESS OF BREATH/ DYSPNEA    Mild intermittent asthma, unspecified whether complicated       budesonide-formoterol 80-4.5 MCG/ACT Inhaler    SYMBICORT    3 Inhaler    Inhale 2 puffs into the lungs 2 times daily    Mild intermittent asthma, unspecified whether complicated       hydrOXYzine 25 MG tablet    ATARAX    60 tablet    Take 1-2 tablets (25-50 mg) by mouth every 6 hours as needed for anxiety    LUCIANO (generalized anxiety disorder)       ibuprofen 200 MG tablet    ADVIL/MOTRIN    100 tablet    Take 1 tablet by mouth every 4 hours as needed for pain.        loratadine 10 MG tablet    CLARITIN     Take 10 mg by mouth daily        PROBIOTIC DAILY PO           VITAMIN D (CHOLECALCIFEROL) PO      Take 5,000 Units by mouth daily

## 2018-11-14 LAB
ALBUMIN SERPL-MCNC: 3.8 G/DL (ref 3.4–5)
ALP SERPL-CCNC: 48 U/L (ref 40–150)
ALT SERPL W P-5'-P-CCNC: 15 U/L (ref 0–50)
ANION GAP SERPL CALCULATED.3IONS-SCNC: 8 MMOL/L (ref 3–14)
AST SERPL W P-5'-P-CCNC: 9 U/L (ref 0–45)
BILIRUB SERPL-MCNC: 0.6 MG/DL (ref 0.2–1.3)
BUN SERPL-MCNC: 20 MG/DL (ref 7–30)
CALCIUM SERPL-MCNC: 9 MG/DL (ref 8.5–10.1)
CHLORIDE SERPL-SCNC: 108 MMOL/L (ref 94–109)
CO2 SERPL-SCNC: 23 MMOL/L (ref 20–32)
CREAT SERPL-MCNC: 0.94 MG/DL (ref 0.52–1.04)
GFR SERPL CREATININE-BSD FRML MDRD: 63 ML/MIN/1.7M2
GLUCOSE SERPL-MCNC: 96 MG/DL (ref 70–99)
POTASSIUM SERPL-SCNC: 3.2 MMOL/L (ref 3.4–5.3)
PROT SERPL-MCNC: 7.4 G/DL (ref 6.8–8.8)
SODIUM SERPL-SCNC: 139 MMOL/L (ref 133–144)

## 2018-11-14 ASSESSMENT — ANXIETY QUESTIONNAIRES: GAD7 TOTAL SCORE: 20

## 2018-11-14 ASSESSMENT — ASTHMA QUESTIONNAIRES: ACT_TOTALSCORE: 6

## 2019-04-01 ENCOUNTER — OFFICE VISIT (OUTPATIENT)
Dept: INTERNAL MEDICINE | Facility: CLINIC | Age: 49
End: 2019-04-01
Payer: COMMERCIAL

## 2019-04-01 VITALS
BODY MASS INDEX: 19.47 KG/M2 | SYSTOLIC BLOOD PRESSURE: 96 MMHG | WEIGHT: 117 LBS | RESPIRATION RATE: 14 BRPM | HEART RATE: 72 BPM | TEMPERATURE: 98 F | OXYGEN SATURATION: 98 % | DIASTOLIC BLOOD PRESSURE: 70 MMHG

## 2019-04-01 DIAGNOSIS — H69.92 DYSFUNCTION OF LEFT EUSTACHIAN TUBE: ICD-10-CM

## 2019-04-01 DIAGNOSIS — H92.02 EAR PAIN, LEFT: Primary | ICD-10-CM

## 2019-04-01 DIAGNOSIS — H61.23 BILATERAL IMPACTED CERUMEN: ICD-10-CM

## 2019-04-01 PROCEDURE — 69209 REMOVE IMPACTED EAR WAX UNI: CPT | Mod: 50 | Performed by: PHYSICIAN ASSISTANT

## 2019-04-01 PROCEDURE — 99213 OFFICE O/P EST LOW 20 MIN: CPT | Mod: 25 | Performed by: PHYSICIAN ASSISTANT

## 2019-04-01 NOTE — PROGRESS NOTES
SUBJECTIVE:   Jie Siddiqui is a 49 year old female who presents to clinic today for the following health issues:      Concern - Ear problem  Onset: 3 weeks    Description:   Left ear pain, ringing and feeling clogged    Intensity: mild    Progression of Symptoms:  worsening    Accompanying Signs & Symptoms:  Frontal headache, mental fogginess    Previous history of similar problem:   Yes- long time ago    Precipitating factors:   Worsened by: adding fluid  Patient also was flying during this time and ears seemed plugged with that.  Additionally used a q tip and felt that it went too far into the left ear canal.     Alleviating factors:  Improved by: sudafed- mildly helpful    Therapies Tried and outcome: OTC ear rinse- not helpful    -------------------------------------    Problem list and histories reviewed & adjusted, as indicated.  Additional history: as documented    Labs reviewed in EPIC    Reviewed and updated as needed this visit by clinical staff       Reviewed and updated as needed this visit by Provider  Allergies  Meds         ROS:  Constitutional, HEENT, cardiovascular, pulmonary, gi and gu systems are negative, except as otherwise noted.    OBJECTIVE:     BP 96/70 (BP Location: Left arm, Patient Position: Chair, Cuff Size: Adult Regular)   Pulse 72   Temp 98  F (36.7  C) (Oral)   Resp 14   Wt 53.1 kg (117 lb)   LMP 11/22/2013   SpO2 98%   BMI 19.47 kg/m    Body mass index is 19.47 kg/m .  GENERAL: healthy, alert and no distress  HENT: normal cephalic/atraumatic, right ear: occluded with wax and after ear lavage by staff clear , left ear: occluded with wax and after ear lavage by staff clear but TM retracted with slight serous fluid noted. , nose and mouth without ulcers or lesions, oropharynx clear and oral mucous membranes moist  NECK: no adenopathy, no asymmetry, masses, or scars and thyroid normal to palpation  RESP: lungs clear to auscultation - no rales, rhonchi or wheezes  CV:  regular rates and rhythm  SKIN: no suspicious lesions or rashes    Diagnostic Test Results:  none     ASSESSMENT/PLAN:             1. Ear pain, left  Secondary to 2 and 3    2. Dysfunction of left eustachian tube      3. Bilateral impacted cerumen    - HC REMOVAL IMPACTED CERUMEN IRRIGATION/LVG UNILAT    Wax removed today   Reviewed allergy meds for ETD and monitor  If ear pain changes worsening then recheck in clinic      Nelda You PA-C  [facility]

## 2019-06-21 ENCOUNTER — OFFICE VISIT (OUTPATIENT)
Dept: OBGYN | Facility: CLINIC | Age: 49
End: 2019-06-21
Payer: COMMERCIAL

## 2019-06-21 VITALS
HEIGHT: 64 IN | BODY MASS INDEX: 20.81 KG/M2 | SYSTOLIC BLOOD PRESSURE: 108 MMHG | DIASTOLIC BLOOD PRESSURE: 64 MMHG | WEIGHT: 121.9 LBS

## 2019-06-21 DIAGNOSIS — Z00.00 ROUTINE GENERAL MEDICAL EXAMINATION AT A HEALTH CARE FACILITY: Primary | ICD-10-CM

## 2019-06-21 DIAGNOSIS — Z12.31 VISIT FOR SCREENING MAMMOGRAM: ICD-10-CM

## 2019-06-21 DIAGNOSIS — Z12.11 ENCOUNTER FOR SCREENING COLONOSCOPY: ICD-10-CM

## 2019-06-21 PROCEDURE — 99396 PREV VISIT EST AGE 40-64: CPT | Performed by: OBSTETRICS & GYNECOLOGY

## 2019-06-21 ASSESSMENT — MIFFLIN-ST. JEOR: SCORE: 1166.9

## 2019-06-21 NOTE — NURSING NOTE
"Chief Complaint   Patient presents with     Gyn Exam   Hx wheezing    Initial /64   Ht 1.632 m (5' 4.25\")   Wt 55.3 kg (121 lb 14.4 oz)   LMP 2013   BMI 20.76 kg/m   Estimated body mass index is 20.76 kg/m  as calculated from the following:    Height as of this encounter: 1.632 m (5' 4.25\").    Weight as of this encounter: 55.3 kg (121 lb 14.4 oz).  BP completed using cuff size: regular    Questioned patient about current smoking habits.  Pt. has never smoked.          The following HM Due: mammogram    Lesly Schroeder CMA               "

## 2019-06-21 NOTE — PROGRESS NOTES
SUBJECTIVE:   CC: Jie Siddiqui is an 49 year old woman who presents for preventive health visit.     Healthy Habits:    Do you get at least three servings of calcium containing foods daily (dairy, green leafy vegetables, etc.)? yes    Amount of exercise or daily activities, outside of work: 3 day(s) per week    Problems taking medications regularly No    Medication side effects: No    Have you had an eye exam in the past two years? yes    Do you see a dentist twice per year? yes    Do you have sleep apnea, excessive snoring or daytime drowsiness?no    F/U'd by Int Med for asthma.    Today's PHQ-2 Score:   PHQ-2 ( 1999 [employer]) 6/21/2019 11/18/2015   Q1: Little interest or pleasure in doing things 0 0   Q2: Feeling down, depressed or hopeless 0 0   PHQ-2 Score 0 0       Abuse: Current or Past(Physical, Sexual or Emotional)- No  Do you feel safe in your environment? Yes    Social History     Tobacco Use     Smoking status: Never Smoker     Smokeless tobacco: Never Used   Substance Use Topics     Alcohol use: Yes     Alcohol/week: 0.0 oz     Comment: occasional wine 1x weekly     If you drink alcohol do you typically have >3 drinks per day or >7 drinks per week? No                     Reviewed orders with patient.  Reviewed health maintenance and updated orders accordingly - Yes  Patient Active Problem List   Diagnosis     CARDIOVASCULAR SCREENING; LDL GOAL LESS THAN 160     Seasonal allergies     Intermittent asthma     Endometriosis     Polycystic ovaries     Anxiety     Past Surgical History:   Procedure Laterality Date     C NONSPECIFIC PROCEDURE  2001, 2004    C/S x2   4/21/01, 1/08/2004     C NONSPECIFIC PROCEDURE  2000    Laparoscopy (endometriosis)  Abstracted 07/16/02     C NONSPECIFIC PROCEDURE  2005    breast augmentation     ECTOPIC PREGNANCY SURGERY  2003    cyst noted in abdomen not tubes     GYN SURGERY Bilateral 11/26/2013    LAPAROSCOPIC HYSTERECTOMY BILATERAL SALPINGECTOMY and cystoscopy;   Surgeon: Vijay Ruiz MD;  Location: [facility] - Path: Endometriosis, Adenomyosis, all benign     LAPAROSCOPIC HYSTERECTOMY TOTAL, BILATERAL SALPINGO-OOPHORECTOMY, COMBINED  11/26/2013    Procedure: COMBINED LAPAROSCOPIC HYSTERECTOMY TOTAL, SALPINGO-OOPHORECTOMY;  LAPAROSCOPIC HYSTERECTOMY BILATERAL SALPINGECTOMY and cystoscopy;  Surgeon: Vijay Ruiz MD;  Location: [facility] OR - Path: Endometriosis, Adenomyosis, all benign       Social History     Tobacco Use     Smoking status: Never Smoker     Smokeless tobacco: Never Used   Substance Use Topics     Alcohol use: Yes     Alcohol/week: 0.0 oz     Comment: occasional wine 1x weekly     Family History   Problem Relation Age of Onset     Lipids Father      Cancer Paternal Grandmother         breast cancer     Cerebrovascular Disease Paternal Grandmother      Lipids Paternal Grandmother      Breast Cancer Paternal Grandmother      Psychotic Disorder Maternal Aunt         bi polar     Asthma No family hx of      C.A.D. No family hx of      Diabetes No family hx of      Hypertension No family hx of      Cancer - colorectal No family hx of      Prostate Cancer No family hx of      Alcohol/Drug No family hx of      Allergies No family hx of      Alzheimer Disease No family hx of      Anesthesia Reaction No family hx of      Arthritis No family hx of      Blood Disease No family hx of      Cardiovascular No family hx of          Current Outpatient Medications   Medication Sig Dispense Refill     albuterol (PROAIR HFA) 108 (90 Base) MCG/ACT inhaler INHALE 2 PUFFS INTO THE LUNGS EVERY 4 HOURS AS NEEDED FOR SHORTNESS OF BREATH/ DYSPNEA 8.5 g 11     budesonide-formoterol (SYMBICORT) 80-4.5 MCG/ACT Inhaler Inhale 2 puffs into the lungs 2 times daily 3 Inhaler 1     hydrOXYzine (ATARAX) 25 MG tablet Take 1-2 tablets (25-50 mg) by mouth every 6 hours as needed for anxiety 60 tablet 0     ibuprofen (ADVIL,MOTRIN) 200 MG tablet Take 1 tablet by mouth every 4 hours as needed for  pain. 100 tablet 3     loratadine (CLARITIN) 10 MG tablet Take 10 mg by mouth daily       Probiotic Product (PROBIOTIC DAILY PO)        VITAMIN D, CHOLECALCIFEROL, PO Take 5,000 Units by mouth daily       Allergies   Allergen Reactions     Erythromycin Hives     Morphine Hives              Mammogram Screening: Patient under age 50, mutual decision reflected in health maintenance.      Pertinent mammograms are reviewed under the imaging tab.  History of abnormal Pap smear: Status post benign hysterectomy. Health Maintenance and Surgical History updated.  PAP / HPV 10/29/2012 10/17/2011 2010   PAP NIL NIL NIL     Reviewed and updated as needed this visit by clinical staff  Tobacco  Allergies  Meds  Problems  Med Hx  Surg Hx  Fam Hx  Soc Hx          Reviewed and updated as needed this visit by Provider  Tobacco  Allergies  Meds  Problems  Med Hx  Surg Hx  Fam Hx  Soc Hx         Past Medical History:   Diagnosis Date     Asthma lifetime     Endometriosis      Polycystic ovarian disease (PCOD)       Past Surgical History:   Procedure Laterality Date     C NONSPECIFIC PROCEDURE  ,     C/S x2   01, 2004     C NONSPECIFIC PROCEDURE      Laparoscopy (endometriosis)  Abstracted 02     C NONSPECIFIC PROCEDURE      breast augmentation     ECTOPIC PREGNANCY SURGERY      cyst noted in abdomen not tubes     GYN SURGERY Bilateral 2013    LAPAROSCOPIC HYSTERECTOMY BILATERAL SALPINGECTOMY and cystoscopy;  Surgeon: Vijay Ruiz MD;  Location: [facility] - Path: Endometriosis, Adenomyosis, all benign     LAPAROSCOPIC HYSTERECTOMY TOTAL, BILATERAL SALPINGO-OOPHORECTOMY, COMBINED  2013    Procedure: COMBINED LAPAROSCOPIC HYSTERECTOMY TOTAL, SALPINGO-OOPHORECTOMY;  LAPAROSCOPIC HYSTERECTOMY BILATERAL SALPINGECTOMY and cystoscopy;  Surgeon: Vijay Ruiz MD;  Location: [facility] - Path: Endometriosis, Adenomyosis, all benign     OB History    Para Term  " AB Living   4 2 2 0 2 2   SAB TAB Ectopic Multiple Live Births   1 1 0 0 2      # Outcome Date GA Lbr Art/2nd Weight Sex Delivery Anes PTL Lv   4 Term 04 39w0d  3.6 kg (7 lb 15 oz) F CS   CHRIS      Birth Comments: none      Name: Amirah Haas SAB 03 7w0d    SAB   DEC   2 Term 01 37w0d 16:00 3.345 kg (7 lb 6 oz) F CS SPINAL  CHRIS      Birth Comments: fetal distress, failure for labor to progress      Name: Fanta      Apgar1: 8  Apgar5: 9   1 TAB 95     TAB   DEC       ROS:  CONSTITUTIONAL: NEGATIVE for fever, chills, change in weight  INTEGUMENTARU/SKIN: NEGATIVE for worrisome rashes, moles or lesions  EYES: NEGATIVE for vision changes or irritation  ENT: NEGATIVE for ear, mouth and throat problems  RESP: NEGATIVE for significant cough or SOB  BREAST: NEGATIVE for masses, tenderness or discharge  CV: NEGATIVE for chest pain, palpitations or peripheral edema  GI: NEGATIVE for nausea, abdominal pain, heartburn, or change in bowel habits  : NEGATIVE for unusual urinary or vaginal symptoms. Periods are regular.  MUSCULOSKELETAL: NEGATIVE for significant arthralgias or myalgia  NEURO: NEGATIVE for weakness, dizziness or paresthesias  PSYCHIATRIC: NEGATIVE for changes in mood or affect    OBJECTIVE:   /64   Ht 1.632 m (5' 4.25\")   Wt 55.3 kg (121 lb 14.4 oz)   LMP 2013   BMI 20.76 kg/m    EXAM:  GENERAL: healthy, alert and no distress  EYES: Eyes grossly normal to inspection, PERRL and conjunctivae and sclerae normal  HENT: ear canals and TM's normal, nose and mouth without ulcers or lesions  NECK: no adenopathy, no asymmetry, masses, or scars and thyroid normal to palpation  RESP: lungs clear to auscultation - no rales, rhonchi or wheezes  BREAST: normal without masses, tenderness or nipple discharge and no palpable axillary masses or adenopathy  CV: regular rate and rhythm, normal S1 S2, no S3 or S4, no murmur, click or rub, no peripheral edema and peripheral pulses " "strong  ABDOMEN: soft, nontender, no hepatosplenomegaly, no masses and bowel sounds normal   (female): normal female external genitalia, normal urethral meatus , vaginal mucosa pink, moist, well rugated, normal post-hysterectomy exam without masses.  Normal adnexa bilaterally.  MS: no gross musculoskeletal defects noted, no edema  SKIN: no suspicious lesions or rashes  NEURO: Normal strength and tone, mentation intact and speech normal  PSYCH: mentation appears normal, affect normal/bright    Diagnostic Test Results:  none     ASSESSMENT/PLAN:       ICD-10-CM    1. Routine general medical examination at a health care facility Z00.00    2. Encounter for screening colonoscopy Z12.11    3. Visit for screening mammogram Z12.31        COUNSELING:   Reviewed preventive health counseling, as reflected in patient instructions       Colon cancer screening       Can consider getting her preventative visits with Internal Medicine going forward.  No longer needs Pap testing due to post-hysterectomy status.  Only pelvic exam needed is bimanually to confirm no obvious adnexal masses annually.    Estimated body mass index is 20.76 kg/m  as calculated from the following:    Height as of this encounter: 1.632 m (5' 4.25\").    Weight as of this encounter: 55.3 kg (121 lb 14.4 oz).    Weight management plan noted, stable and monitoring     reports that she has never smoked. She has never used smokeless tobacco.      Counseling Resources:  ATP IV Guidelines  Pooled Cohorts Equation Calculator  Breast Cancer Risk Calculator  FRAX Risk Assessment  ICSI Preventive Guidelines  Dietary Guidelines for Americans, 2010  USDA's MyPlate  ASA Prophylaxis  Lung CA Screening    Erick Barboza MD  [facility]  "

## 2019-06-27 ENCOUNTER — ANCILLARY PROCEDURE (OUTPATIENT)
Dept: MAMMOGRAPHY | Facility: CLINIC | Age: 49
End: 2019-06-27
Attending: OBSTETRICS & GYNECOLOGY
Payer: COMMERCIAL

## 2019-06-27 DIAGNOSIS — Z12.31 VISIT FOR SCREENING MAMMOGRAM: ICD-10-CM

## 2019-06-27 PROCEDURE — 77067 SCR MAMMO BI INCL CAD: CPT | Performed by: STUDENT IN AN ORGANIZED HEALTH CARE EDUCATION/TRAINING PROGRAM

## 2019-07-05 ENCOUNTER — TELEPHONE (OUTPATIENT)
Dept: OBGYN | Facility: CLINIC | Age: 49
End: 2019-07-05

## 2019-07-05 DIAGNOSIS — R30.0 DYSURIA: ICD-10-CM

## 2019-07-05 RX ORDER — CIPROFLOXACIN 250 MG/1
250 TABLET, FILM COATED ORAL 2 TIMES DAILY
Qty: 6 TABLET | Refills: 0 | Status: SHIPPED | OUTPATIENT
Start: 2019-07-05 | End: 2019-12-31

## 2019-07-05 NOTE — TELEPHONE ENCOUNTER
Pt calling with c/o dysuria and urinary frequency that started yesterday.     She has a hx of UTI and states that she usually gets Cipro, as macrobid doesn't usually work.     She is currently out of town until Sunday and is hoping to be treated today.    Sent to md on-call.    Cheryl DAVIES RN

## 2019-07-05 NOTE — TELEPHONE ENCOUNTER
Agree rx signed   Dr. Amrita Phillips, DO    Obstetrics and Gynecology  [facility]

## 2019-07-15 ENCOUNTER — TELEPHONE (OUTPATIENT)
Dept: INTERNAL MEDICINE | Facility: CLINIC | Age: 49
End: 2019-07-15

## 2019-07-15 NOTE — TELEPHONE ENCOUNTER
Panel Management Review      Patient has the following on her problem list:     Asthma review     ACT Total Scores 11/13/2018   ACT TOTAL SCORE -   ASTHMA ER VISITS -   ASTHMA HOSPITALIZATIONS -   ACT TOTAL SCORE (Goal Greater than or Equal to 20) 6   In the past 12 months, how many times did you visit the emergency room for your asthma without being admitted to the hospital? 0   In the past 12 months, how many times were you hospitalized overnight because of your asthma? 0      1. Is Asthma diagnosis on the Problem List? Yes    2. Is Asthma listed on Health Maintenance? Yes    3. Patient is due for:  ACT      Composite cancer screening  Chart review shows that this patient is due/due soon for the following None  Summary:    Patient is due/failing the following:   ACT, FOLLOW UP and PHQ9    Action needed:   Patient needs office visit for med check for anxiety and asthma.    Type of outreach:    Sent letter.    Questions for provider review:    None                                                                                                                                    Alyson Jones CMA       Chart routed to no one .

## 2019-07-15 NOTE — LETTER
[facility]  [address], [address]  [place], [place]  [address]                                            TEL:[phone]  FAX:[phone]        Jie Siddiqui  [address] [address] [facility] [id number]      July 15, 2019    Dear Jie,    At [facility] we care about your health and well-being.  A review of your chart has indicated that you are due for a follow up on asthma.  Please contact us at [phone] to schedule an appointment.        Sincerely,      Celia Gordon M.D.

## 2019-07-24 ENCOUNTER — VIRTUAL VISIT (OUTPATIENT)
Dept: FAMILY MEDICINE | Facility: OTHER | Age: 49
End: 2019-07-24

## 2019-07-24 NOTE — PROGRESS NOTES
"Date:   Clinician: Axel Murrya  Clinician NPI: [id number]  Patient: Jie Siddiqui  Patient : 1970  Patient Address: [address] [place], [place] [address]  Patient Phone: [phone]  Visit Protocol: UTI  Patient Summary:  Jie is a 49 year old ( : 1970 ) female who initiated a Visit for a presumed bladder infection. When asked the question \"Please sign me up to receive news, health information and promotions. \", Jie responded \"No\".   Her symptoms started 1-3 days ago and consist of urinary frequency, urgency, and dysuria.   Symptom details   Urine color: The color of her urine is yellow.    Denied symptoms include chills, vaginal discharge, urinary incontinence, vomiting, vaginal itching, foul-smelling urine, nausea, feeling as if the bladder is never empty, flank pain, and abdominal pain. She does not feel feverish.   Jie has not used any over-the-counter medications or home remedies to relieve her current symptoms.  Precipitating events  Jie denies having a sexually transmitted disease.  Pertinent medical history  Jie has had a bladder infection before and has had 1 in the past 12 months. Her most recent bladder infection was not within the last 4 weeks. Her current symptoms are similar to her previous bladder infection symptoms.   Ciprofloxacin (Cipro) has been effective in treating her past bladder infections.   Jie has not been prescribed antibiotics to prevent frequent or repeated bladder infections in the past and does not get yeast infections when she takes antibiotics. She has not experienced problems or side effects with any of the common antibiotics used to treat bladder infections.   Jie does not have a history of kidney stones. She has not used a catheter or been a patient in a hospital or nursing home in the past 2 weeks.   Jie does not smoke or use smokeless tobacco.   She denies pregnancy and denies breastfeeding. She has menstruated in the past month. "     MEDICATIONS: No current medications, ALLERGIES: NKDA  Clinician Response:  Dear Jie,  Based on the information you have provided, you likely have an acute urinary tract infection, also called a bladder infection. Bladder infections occur when bacteria from the outside of the body enters the urinary tract. Any part of the urinary system can be infected, but the bladder is the most common.  Medication information  I am prescribing:     Nitrofurantoin monohyd/m-cryst (Macrobid) 100 mg oral capsule. Take 1 capsule by mouth every 12 hours for 5 days. Take this medication with food. There are no refills with this prescription.   The medication I prescribed for your bladder infection is an antibiotic. Continue taking the medication until it is gone even if you feel better.   Yeast infections can be a common side effect of antibiotics. The most common symptom of a yeast infection is itchiness in and around the vagina. Other signs and symptoms include burning, redness, or a thick, white vaginal discharge that looks like cottage cheese and does not have a bad smell.  Self care  Urination helps to flush bacteria from the urinary tract. For this reason, drinking water and urinating often helps relieve some urinary symptoms and can decrease your risk of getting bladder infections in the future.  Other steps you can take to prevent future bladder infections include:     Wipe front to back after using the bathroom    Urinate after sexual intercourse    Avoid using deodorant sprays, douches, or powders in the vaginal area     When to seek care  Please make an appointment to be seen in a clinic or urgent care if any of the following occur:     You develop new symptoms or your symptoms become worse    You have medication side effects that make it difficult to take them as prescribed    Your symptoms do not improve within 1-2 days of starting treatment    You have symptoms of a bladder infection that return shortly after  completing treatment     It is possible to have an allergic reaction to an antibiotic even if you have not had one in the past. If you notice a new rash, significant swelling, or difficulty breathing, stop taking this medication immediately and go to a clinic or urgent care.   Diagnosis: Acute uncomplicated bladder infection  Diagnosis ICD: N39.0  Prescription: nitrofurantoin monohyd/m-cryst (Macrobid) 100 mg oral capsule 10 capsule, 5 days supply. Take 1 capsule by mouth every 12 hours for 5 days. Refills: 0, Refill as needed: no, Allow substitutions: yes  Pharmacy: [id number] - [phone] - [address] [place] [address]

## 2019-12-08 DIAGNOSIS — J45.20 MILD INTERMITTENT ASTHMA, UNSPECIFIED WHETHER COMPLICATED: ICD-10-CM

## 2019-12-09 NOTE — TELEPHONE ENCOUNTER
"Requested Prescriptions   Pending Prescriptions Disp Refills     albuterol (PROAIR HFA/PROVENTIL HFA/VENTOLIN HFA) 108 (90 Base) MCG/ACT inhaler [Pharmacy Med Name: ALBUTEROL HFA INH (200 PUFFS) 8.5GM] 8.5 g 0     Sig: INHALE 2 PUFFS INTO THE LUNGS EVERY 4 HOURS AS NEEDED FOR SHORTNESS OF BREATH       Asthma Maintenance Inhalers - Anticholinergics Failed - 12/8/2019  2:37 PM        Failed - Asthma control assessment score within normal limits in last 6 months     Please review ACT score.           Failed - Recent (6 mo) or future (30 days) visit within the authorizing provider's specialty     Patient had office visit in the last 6 months or has a visit in the next 30 days with authorizing provider or within the authorizing provider's specialty.  See \"Patient Info\" tab in inbasket, or \"Choose Columns\" in Meds & Orders section of the refill encounter.        Last Written Prescription Date:  11/13/18  Last Fill Quantity: 8.5 g,  # refills: 11   Last office visit: 4/1/2019 with prescribing provider:  11/13/18   Future Office Visit:            Passed - Patient is age 12 years or older        Passed - Medication is active on med list        SYMBICORT 80-4.5 MCG/ACT Inhaler [Pharmacy Med Name: SYMBICORT 80/4.5MCG (120  ORAL INH)] 30.6 g 0     Sig: INHALE 2 PUFFS INTO THE LUNGS TWICE DAILY       Inhaled Steroids Protocol Failed - 12/8/2019  2:37 PM        Failed - Asthma control assessment score within normal limits in last 6 months     Please review ACT score.           Failed - Recent (6 mo) or future (30 days) visit within the authorizing provider's specialty     Patient had office visit in the last 6 months or has a visit in the next 30 days with authorizing provider or within the authorizing provider's specialty.  See \"Patient Info\" tab in inbasket, or \"Choose Columns\" in Meds & Orders section of the refill encounter.            Passed - Patient is age 12 or older        Passed - Medication is active on med list    "     Last Written Prescription Date:  11/13/18  Last Fill Quantity: 3 inhaler,  # refills: 1   Last office visit: 4/1/2019 with prescribing provider:  11/13/18   Future Office Visit:

## 2019-12-10 NOTE — TELEPHONE ENCOUNTER
Patient has not been seen in over a year. No future appointments scheduled.     Left a voicemail asking patient to call the clinic back. Please advise patient she will need an appointment on file for refills.

## 2019-12-13 RX ORDER — ALBUTEROL SULFATE 90 UG/1
AEROSOL, METERED RESPIRATORY (INHALATION)
Qty: 8.5 G | Refills: 0 | Status: SHIPPED | OUTPATIENT
Start: 2019-12-13 | End: 2019-12-31

## 2019-12-13 RX ORDER — DILTIAZEM HYDROCHLORIDE 60 MG/1
TABLET, FILM COATED ORAL
Qty: 10.2 G | Refills: 0 | Status: SHIPPED | OUTPATIENT
Start: 2019-12-13 | End: 2019-12-31

## 2019-12-13 NOTE — TELEPHONE ENCOUNTER
Patient called back and scheduled OV for 12/31 with Zoë devine and is requesting refills until seen.  Patient states she can not breathe without it.

## 2019-12-31 ENCOUNTER — OFFICE VISIT (OUTPATIENT)
Dept: INTERNAL MEDICINE | Facility: CLINIC | Age: 49
End: 2019-12-31
Payer: COMMERCIAL

## 2019-12-31 VITALS
OXYGEN SATURATION: 99 % | DIASTOLIC BLOOD PRESSURE: 61 MMHG | TEMPERATURE: 98.2 F | BODY MASS INDEX: 20.42 KG/M2 | HEART RATE: 78 BPM | RESPIRATION RATE: 18 BRPM | SYSTOLIC BLOOD PRESSURE: 108 MMHG | HEIGHT: 64 IN | WEIGHT: 119.6 LBS

## 2019-12-31 DIAGNOSIS — J45.20 MILD INTERMITTENT ASTHMA, UNSPECIFIED WHETHER COMPLICATED: ICD-10-CM

## 2019-12-31 DIAGNOSIS — J30.2 SEASONAL ALLERGIC RHINITIS, UNSPECIFIED TRIGGER: Primary | ICD-10-CM

## 2019-12-31 PROCEDURE — 99214 OFFICE O/P EST MOD 30 MIN: CPT | Performed by: NURSE PRACTITIONER

## 2019-12-31 RX ORDER — ALBUTEROL SULFATE 90 UG/1
AEROSOL, METERED RESPIRATORY (INHALATION)
Qty: 8.5 G | Refills: 5 | Status: SHIPPED | OUTPATIENT
Start: 2019-12-31 | End: 2020-08-03

## 2019-12-31 RX ORDER — FLUTICASONE PROPIONATE 50 MCG
1 SPRAY, SUSPENSION (ML) NASAL 2 TIMES DAILY PRN
Qty: 9.9 ML | Refills: 3 | Status: SHIPPED | OUTPATIENT
Start: 2019-12-31 | End: 2022-12-02

## 2019-12-31 RX ORDER — BUDESONIDE AND FORMOTEROL FUMARATE DIHYDRATE 80; 4.5 UG/1; UG/1
AEROSOL RESPIRATORY (INHALATION)
Qty: 10.2 G | Refills: 5 | Status: SHIPPED | OUTPATIENT
Start: 2019-12-31 | End: 2020-08-03

## 2019-12-31 SDOH — HEALTH STABILITY: MENTAL HEALTH: HOW OFTEN DO YOU HAVE A DRINK CONTAINING ALCOHOL?: 2-3 TIMES A WEEK

## 2019-12-31 SDOH — HEALTH STABILITY: MENTAL HEALTH: HOW MANY DRINKS CONTAINING ALCOHOL DO YOU HAVE ON A TYPICAL DAY WHEN YOU ARE DRINKING?: 1 OR 2

## 2019-12-31 SDOH — HEALTH STABILITY: MENTAL HEALTH: HOW OFTEN DO YOU HAVE SIX OR MORE DRINKS ON ONE OCCASION?: NEVER

## 2019-12-31 ASSESSMENT — PATIENT HEALTH QUESTIONNAIRE - PHQ9: SUM OF ALL RESPONSES TO PHQ QUESTIONS 1-9: 0

## 2019-12-31 ASSESSMENT — MIFFLIN-ST. JEOR: SCORE: 1156.47

## 2019-12-31 NOTE — NURSING NOTE
"/61 (BP Location: Left arm, Patient Position: Sitting, Cuff Size: Adult Regular)   Pulse 78   Temp 98.2  F (36.8  C) (Oral)   Resp 18   Ht 1.632 m (5' 4.25\")   Wt 54.3 kg (119 lb 9.6 oz)   LMP 11/22/2013   SpO2 99%   BMI 20.37 kg/m    Patient is being seen for a medication check.  "

## 2019-12-31 NOTE — PROGRESS NOTES
Subjective     Jie Siddiqui is a 49 year old female who presents to clinic today for the following health issues:  Patient is being seen for a medication check.  Needing Rx refills on her asthma medications.  States with activity and shoveling snow she is wheezing. Stuffy nose with throat drainage.  Takes OTC Claritin helps some.  No cold symptoms or fever or facial pain.       Patient Active Problem List   Diagnosis     CARDIOVASCULAR SCREENING; LDL GOAL LESS THAN 160     Seasonal allergies     Intermittent asthma     Endometriosis     Polycystic ovaries     Anxiety     Past Surgical History:   Procedure Laterality Date     C NONSPECIFIC PROCEDURE  2001, 2004    C/S x2   4/21/01, 1/08/2004     C NONSPECIFIC PROCEDURE  2000    Laparoscopy (endometriosis)  Abstracted 07/16/02     C NONSPECIFIC PROCEDURE  2005    breast augmentation     ECTOPIC PREGNANCY SURGERY  2003    cyst noted in abdomen not tubes     GYN SURGERY Bilateral 11/26/2013    LAPAROSCOPIC HYSTERECTOMY BILATERAL SALPINGECTOMY and cystoscopy;  Surgeon: Vijay Ruiz MD;  Location: [facility] - Path: Endometriosis, Adenomyosis, all benign     LAPAROSCOPIC HYSTERECTOMY TOTAL, BILATERAL SALPINGO-OOPHORECTOMY, COMBINED  11/26/2013    Procedure: COMBINED LAPAROSCOPIC HYSTERECTOMY TOTAL, SALPINGO-OOPHORECTOMY;  LAPAROSCOPIC HYSTERECTOMY BILATERAL SALPINGECTOMY and cystoscopy;  Surgeon: Vijay Ruiz MD;  Location: [facility] - Path: Endometriosis, Adenomyosis, all benign       Social History     Tobacco Use     Smoking status: Never Smoker     Smokeless tobacco: Never Used   Substance Use Topics     Alcohol use: Yes     Alcohol/week: 2.0 standard drinks     Types: 1 Glasses of wine, 1 Shots of liquor per week     Frequency: 2-3 times a week     Drinks per session: 1 or 2     Binge frequency: Never     Comment: occasional wine 1x weekly     Family History   Problem Relation Age of Onset     Lipids Father      Lung Cancer Mother      Cancer Paternal  Grandmother         breast cancer     Cerebrovascular Disease Paternal Grandmother      Lipids Paternal Grandmother      Breast Cancer Paternal Grandmother      Psychotic Disorder Maternal Aunt         bi polar     Asthma No family hx of      C.A.D. No family hx of      Diabetes No family hx of      Hypertension No family hx of      Cancer - colorectal No family hx of      Prostate Cancer No family hx of      Alcohol/Drug No family hx of      Allergies No family hx of      Alzheimer Disease No family hx of      Anesthesia Reaction No family hx of      Arthritis No family hx of      Blood Disease No family hx of      Cardiovascular No family hx of          Current Outpatient Medications   Medication Sig Dispense Refill     albuterol (PROAIR HFA/PROVENTIL HFA/VENTOLIN HFA) 108 (90 Base) MCG/ACT inhaler INHALE 2 PUFFS INTO THE LUNGS EVERY 4 HOURS AS NEEDED FOR SHORTNESS OF BREATH 8.5 g 5     budesonide-formoterol (SYMBICORT) 80-4.5 MCG/ACT Inhaler INHALE 2 PUFFS INTO THE LUNGS TWICE DAILY 10.2 g 5     fluticasone (FLONASE) 50 MCG/ACT nasal spray Spray 1 spray into both nostrils 2 times daily as needed for rhinitis or allergies 9.9 mL 3     ibuprofen (ADVIL,MOTRIN) 200 MG tablet Take 1 tablet by mouth every 4 hours as needed for pain. 100 tablet 3     loratadine (CLARITIN) 10 MG tablet Take 10 mg by mouth daily       Probiotic Product (PROBIOTIC DAILY PO)        VITAMIN D, CHOLECALCIFEROL, PO Take 5,000 Units by mouth daily       Allergies   Allergen Reactions     Erythromycin Hives     Morphine Hives                Reviewed and updated as needed this visit by Provider  Tobacco  Allergies  Med Hx  Surg Hx  Fam Hx  Soc Hx        Review of Systems   ROS COMP: Constitutional, HEENT, cardiovascular, pulmonary, gi and gu systems are negative, except as otherwise noted.      Objective    /61 (BP Location: Left arm, Patient Position: Sitting, Cuff Size: Adult Regular)   Pulse 78   Temp 98.2  F (36.8  C) (Oral)    "Resp 18   Ht 1.632 m (5' 4.25\")   Wt 54.3 kg (119 lb 9.6 oz)   LMP 11/22/2013   SpO2 99%   BMI 20.37 kg/m       Body mass index is 20.37 kg/m .  Physical Exam   GENERAL: alert and no distress  HENT:  Nose: pale and boggy with nasal tone; mouth without ulcers or lesions with clear PND noted  NECK: no adenopathy, no asymmetry, masses, or scars and thyroid normal to palpation  RESP: lungs clear to auscultation - no rales, rhonchi or wheezes  CV: regular rate and rhythm, normal S1 S2, no S3 or S4, no murmur, click or rub, no peripheral edema and peripheral pulses strong  MS: no edema  SKIN: no suspicious lesions or rashes    Diagnostic Test Results:  Labs reviewed in Epic        Assessment & Plan     1. Mild intermittent asthma, unspecified whether complicated  - Rx refills:    budesonide-formoterol (SYMBICORT) 80-4.5 MCG/ACT Inhaler; INHALE 2 PUFFS INTO THE LUNGS TWICE DAILY  Dispense: 10.2 g; Refill: 5    albuterol (PROAIR HFA/PROVENTIL HFA/VENTOLIN HFA) 108 (90 Base) MCG/ACT inhaler; INHALE 2 PUFFS INTO THE LUNGS EVERY 4 HOURS AS NEEDED FOR SHORTNESS OF BREATH  Dispense: 8.5 g; Refill: 5  - encouraged to take the Symbicort more regularly as directed when symptoms persist.  - Asthma Action Plan completed    2. Seasonal allergic rhinitis, unspecified trigger  - Will try Rx with instructions how to use properly.:  - fluticasone (FLONASE) 50 MCG/ACT nasal spray; Spray 1 spray into both nostrils 2 times daily as needed for rhinitis or allergies  Dispense: 9.9 mL; Refill: 3       Patient Instructions   Medications refilled    Added Fluticasone nasal spray for stuffy nose - take 1 spray twice daily as needed for allergies       Return in about 6 months (around 6/30/2020) for Physical Exam.    Zoë Brambila CNP  [facility]"

## 2019-12-31 NOTE — PATIENT INSTRUCTIONS
Medications refilled    Added Fluticasone nasal spray for stuffy nose - take 1 spray twice daily as needed for allergies

## 2020-01-01 ASSESSMENT — ASTHMA QUESTIONNAIRES: ACT_TOTALSCORE: 15

## 2020-07-14 ENCOUNTER — HOSPITAL ENCOUNTER (OUTPATIENT)
Dept: MAMMOGRAPHY | Facility: CLINIC | Age: 50
Discharge: HOME | End: 2020-07-14
Attending: OBSTETRICS & GYNECOLOGY | Admitting: OBSTETRICS & GYNECOLOGY
Payer: COMMERCIAL

## 2020-07-14 DIAGNOSIS — Z12.31 VISIT FOR SCREENING MAMMOGRAM: ICD-10-CM

## 2020-07-14 PROCEDURE — 77067 SCR MAMMO BI INCL CAD: CPT

## 2020-08-03 ENCOUNTER — OFFICE VISIT (OUTPATIENT)
Dept: INTERNAL MEDICINE | Facility: CLINIC | Age: 50
End: 2020-08-03
Payer: COMMERCIAL

## 2020-08-03 VITALS
TEMPERATURE: 98 F | HEART RATE: 62 BPM | OXYGEN SATURATION: 100 % | HEIGHT: 65 IN | SYSTOLIC BLOOD PRESSURE: 97 MMHG | WEIGHT: 118 LBS | RESPIRATION RATE: 16 BRPM | DIASTOLIC BLOOD PRESSURE: 65 MMHG | BODY MASS INDEX: 19.66 KG/M2

## 2020-08-03 DIAGNOSIS — Z13.220 ENCOUNTER FOR LIPID SCREENING FOR CARDIOVASCULAR DISEASE: ICD-10-CM

## 2020-08-03 DIAGNOSIS — Z13.6 ENCOUNTER FOR LIPID SCREENING FOR CARDIOVASCULAR DISEASE: ICD-10-CM

## 2020-08-03 DIAGNOSIS — J45.20 MILD INTERMITTENT ASTHMA, UNSPECIFIED WHETHER COMPLICATED: ICD-10-CM

## 2020-08-03 DIAGNOSIS — Z00.00 ENCOUNTER FOR PREVENTIVE HEALTH EXAMINATION: ICD-10-CM

## 2020-08-03 DIAGNOSIS — Z12.11 SCREEN FOR COLON CANCER: Primary | ICD-10-CM

## 2020-08-03 LAB
ALBUMIN SERPL-MCNC: 3.7 G/DL (ref 3.4–5)
ALP SERPL-CCNC: 54 U/L (ref 40–150)
ALT SERPL W P-5'-P-CCNC: 18 U/L (ref 0–50)
ANION GAP SERPL CALCULATED.3IONS-SCNC: 4 MMOL/L (ref 3–14)
AST SERPL W P-5'-P-CCNC: 11 U/L (ref 0–45)
BASOPHILS # BLD AUTO: 0 10E9/L (ref 0–0.2)
BASOPHILS NFR BLD AUTO: 0.8 %
BILIRUB SERPL-MCNC: 0.6 MG/DL (ref 0.2–1.3)
BUN SERPL-MCNC: 18 MG/DL (ref 7–30)
CALCIUM SERPL-MCNC: 8.7 MG/DL (ref 8.5–10.1)
CHLORIDE SERPL-SCNC: 112 MMOL/L (ref 94–109)
CHOLEST SERPL-MCNC: 201 MG/DL
CO2 SERPL-SCNC: 24 MMOL/L (ref 20–32)
CREAT SERPL-MCNC: 0.86 MG/DL (ref 0.52–1.04)
DIFFERENTIAL METHOD BLD: NORMAL
EOSINOPHIL # BLD AUTO: 0.2 10E9/L (ref 0–0.7)
EOSINOPHIL NFR BLD AUTO: 3.5 %
ERYTHROCYTE [DISTWIDTH] IN BLOOD BY AUTOMATED COUNT: 12.6 % (ref 10–15)
GFR SERPL CREATININE-BSD FRML MDRD: 79 ML/MIN/{1.73_M2}
GLUCOSE SERPL-MCNC: 109 MG/DL (ref 70–99)
HCT VFR BLD AUTO: 42.2 % (ref 35–47)
HDLC SERPL-MCNC: 82 MG/DL
HGB BLD-MCNC: 14.1 G/DL (ref 11.7–15.7)
LDLC SERPL CALC-MCNC: 104 MG/DL
LYMPHOCYTES # BLD AUTO: 2.1 10E9/L (ref 0.8–5.3)
LYMPHOCYTES NFR BLD AUTO: 40.5 %
MCH RBC QN AUTO: 31.8 PG (ref 26.5–33)
MCHC RBC AUTO-ENTMCNC: 33.4 G/DL (ref 31.5–36.5)
MCV RBC AUTO: 95 FL (ref 78–100)
MONOCYTES # BLD AUTO: 0.5 10E9/L (ref 0–1.3)
MONOCYTES NFR BLD AUTO: 9 %
NEUTROPHILS # BLD AUTO: 2.4 10E9/L (ref 1.6–8.3)
NEUTROPHILS NFR BLD AUTO: 46.2 %
NONHDLC SERPL-MCNC: 119 MG/DL
PLATELET # BLD AUTO: 182 10E9/L (ref 150–450)
POTASSIUM SERPL-SCNC: 4.1 MMOL/L (ref 3.4–5.3)
PROT SERPL-MCNC: 7.3 G/DL (ref 6.8–8.8)
RBC # BLD AUTO: 4.43 10E12/L (ref 3.8–5.2)
SODIUM SERPL-SCNC: 140 MMOL/L (ref 133–144)
TRIGL SERPL-MCNC: 76 MG/DL
WBC # BLD AUTO: 5.1 10E9/L (ref 4–11)

## 2020-08-03 PROCEDURE — 80053 COMPREHEN METABOLIC PANEL: CPT | Performed by: NURSE PRACTITIONER

## 2020-08-03 PROCEDURE — 80061 LIPID PANEL: CPT | Performed by: NURSE PRACTITIONER

## 2020-08-03 PROCEDURE — 99396 PREV VISIT EST AGE 40-64: CPT | Performed by: NURSE PRACTITIONER

## 2020-08-03 PROCEDURE — 36415 COLL VENOUS BLD VENIPUNCTURE: CPT | Performed by: NURSE PRACTITIONER

## 2020-08-03 PROCEDURE — 82306 VITAMIN D 25 HYDROXY: CPT | Performed by: NURSE PRACTITIONER

## 2020-08-03 PROCEDURE — 85025 COMPLETE CBC W/AUTO DIFF WBC: CPT | Performed by: NURSE PRACTITIONER

## 2020-08-03 RX ORDER — ALBUTEROL SULFATE 90 UG/1
AEROSOL, METERED RESPIRATORY (INHALATION)
Qty: 8.5 G | Refills: 5 | Status: SHIPPED | OUTPATIENT
Start: 2020-08-03 | End: 2021-11-04

## 2020-08-03 RX ORDER — BUDESONIDE AND FORMOTEROL FUMARATE DIHYDRATE 80; 4.5 UG/1; UG/1
AEROSOL RESPIRATORY (INHALATION)
Qty: 10.2 G | Refills: 5 | Status: SHIPPED | OUTPATIENT
Start: 2020-08-03 | End: 2021-11-04 | Stop reason: ALTCHOICE

## 2020-08-03 ASSESSMENT — ENCOUNTER SYMPTOMS
DIARRHEA: 0
NERVOUS/ANXIOUS: 0
NAUSEA: 0
WEAKNESS: 0
PARESTHESIAS: 0
ARTHRALGIAS: 0
FEVER: 0
CHILLS: 0
DIZZINESS: 0
HEADACHES: 0
CONSTIPATION: 0
SORE THROAT: 0
SHORTNESS OF BREATH: 0
HEMATURIA: 0
MYALGIAS: 0
BREAST MASS: 0
PALPITATIONS: 0
HEMATOCHEZIA: 0
ABDOMINAL PAIN: 0
JOINT SWELLING: 0
DYSURIA: 0
COUGH: 0
HEARTBURN: 0
EYE PAIN: 0
FREQUENCY: 0

## 2020-08-03 ASSESSMENT — MIFFLIN-ST. JEOR: SCORE: 1148.18

## 2020-08-03 NOTE — PROGRESS NOTES
SUBJECTIVE:   CC: Jie Siddiqui is an 50 year old woman who presents for preventive health visit.     Fasting.    Healthy Habits:     Getting at least 3 servings of Calcium per day:  Yes    Bi-annual eye exam:  NO    Dental care twice a year:  Yes    Sleep apnea or symptoms of sleep apnea:  None    Diet:  Regular (no restrictions)    Frequency of exercise:  2-3 days/week    Duration of exercise:  30-45 minutes    Taking medications regularly:  Yes    Medication side effects:  Not applicable    PHQ-2 Total Score: 0    Additional concerns today:  No        Today's PHQ-2 Score:   PHQ-2 ( 1999 [employer]) 8/3/2020   Q1: Little interest or pleasure in doing things 0   Q2: Feeling down, depressed or hopeless 0   PHQ-2 Score 0   Q1: Little interest or pleasure in doing things Not at all   Q2: Feeling down, depressed or hopeless Not at all   PHQ-2 Score 0       Abuse: Current or Past(Physical, Sexual or Emotional)- No  Do you feel safe in your environment? Yes    Here for physical exam.  Overall doing ok.    No fever or cough.  No shortness of breathe or chest pain.  No stomach issues.  No pain.    Next mammogram due 7/2022 (2 years) and pap (discontinued s/p T. Hysterectomy from Endometriosis)      Social History     Tobacco Use     Smoking status: Never Smoker     Smokeless tobacco: Never Used   Substance Use Topics     Alcohol use: Yes     Alcohol/week: 2.0 standard drinks     Types: 1 Glasses of wine, 1 Shots of liquor per week     Frequency: 2-3 times a week     Drinks per session: 1 or 2     Binge frequency: Never     Comment: occasional wine 1x weekly     If you drink alcohol do you typically have >3 drinks per day or >7 drinks per week? No    Alcohol Use 8/3/2020   Prescreen: >3 drinks/day or >7 drinks/week? No   Prescreen: >3 drinks/day or >7 drinks/week? -       Reviewed orders with patient.  Reviewed health maintenance and updated orders accordingly - Yes  Lab work is in process  Labs reviewed in EPIC  Patient  Active Problem List   Diagnosis     CARDIOVASCULAR SCREENING; LDL GOAL LESS THAN 160     Seasonal allergies     Intermittent asthma     Endometriosis     Polycystic ovaries     Anxiety     Past Surgical History:   Procedure Laterality Date     C NONSPECIFIC PROCEDURE  2001, 2004    C/S x2   4/21/01, 1/08/2004     C NONSPECIFIC PROCEDURE  2000    Laparoscopy (endometriosis)  Abstracted 07/16/02     C NONSPECIFIC PROCEDURE  2005    breast augmentation     ECTOPIC PREGNANCY SURGERY  2003    cyst noted in abdomen not tubes     GYN SURGERY Bilateral 11/26/2013    LAPAROSCOPIC HYSTERECTOMY BILATERAL SALPINGECTOMY and cystoscopy;  Surgeon: Vijay Ruiz MD;  Location: [facility] OR - Path: Endometriosis, Adenomyosis, all benign     LAPAROSCOPIC HYSTERECTOMY TOTAL, BILATERAL SALPINGO-OOPHORECTOMY, COMBINED  11/26/2013    Procedure: COMBINED LAPAROSCOPIC HYSTERECTOMY TOTAL, SALPINGO-OOPHORECTOMY;  LAPAROSCOPIC HYSTERECTOMY BILATERAL SALPINGECTOMY and cystoscopy;  Surgeon: Vijay Ruiz MD;  Location: [facility] OR - Path: Endometriosis, Adenomyosis, all benign       Social History     Tobacco Use     Smoking status: Never Smoker     Smokeless tobacco: Never Used   Substance Use Topics     Alcohol use: Yes     Alcohol/week: 2.0 standard drinks     Types: 1 Glasses of wine, 1 Shots of liquor per week     Frequency: 2-3 times a week     Drinks per session: 1 or 2     Binge frequency: Never     Comment: occasional wine 1x weekly     Family History   Problem Relation Age of Onset     Lipids Father      Lung Cancer Mother 76        smoker     Liver Cancer Mother      Cancer Paternal Grandmother         breast cancer     Cerebrovascular Disease Paternal Grandmother      Lipids Paternal Grandmother      Breast Cancer Paternal Grandmother      Psychotic Disorder Maternal Aunt         bi polar     Asthma No family hx of      C.A.D. No family hx of      Diabetes No family hx of      Hypertension No family hx of      Cancer - colorectal No  family hx of      Prostate Cancer No family hx of      Alcohol/Drug No family hx of      Allergies No family hx of      Alzheimer Disease No family hx of      Anesthesia Reaction No family hx of      Arthritis No family hx of      Blood Disease No family hx of      Cardiovascular No family hx of          Current Outpatient Medications   Medication Sig Dispense Refill     albuterol (PROAIR HFA/PROVENTIL HFA/VENTOLIN HFA) 108 (90 Base) MCG/ACT inhaler INHALE 2 PUFFS INTO THE LUNGS EVERY 4 HOURS AS NEEDED FOR SHORTNESS OF BREATH 8.5 g 5     budesonide-formoterol (SYMBICORT) 80-4.5 MCG/ACT Inhaler INHALE 2 PUFFS INTO THE LUNGS TWICE DAILY 10.2 g 5     fluticasone (FLONASE) 50 MCG/ACT nasal spray Spray 1 spray into both nostrils 2 times daily as needed for rhinitis or allergies 9.9 mL 3     ibuprofen (ADVIL,MOTRIN) 200 MG tablet Take 1 tablet by mouth every 4 hours as needed for pain. 100 tablet 3     loratadine (CLARITIN) 10 MG tablet Take 10 mg by mouth daily       Probiotic Product (PROBIOTIC DAILY PO)        VITAMIN D, CHOLECALCIFEROL, PO Take 5,000 Units by mouth daily       Allergies   Allergen Reactions     Erythromycin Hives     Morphine Hives              Mammogram Screening: Patient over age 50, mutual decision to screen reflected in health maintenance.    Pertinent mammograms are reviewed under the imaging tab.  History of abnormal Pap smear: Status post benign hysterectomy. Health Maintenance and Surgical History updated.  PAP / HPV 10/29/2012 10/17/2011 12/6/2010   PAP NIL NIL NIL     Reviewed and updated as needed this visit by clinical staff  Tobacco  Allergies  Meds  Med Hx  Surg Hx  Fam Hx  Soc Hx        Reviewed and updated as needed this visit by Provider  Tobacco  Allergies  Meds  Med Hx  Surg Hx  Fam Hx  Soc Hx       Past Medical History:   Diagnosis Date     Asthma lifetime     Endometriosis 2000     Polycystic ovarian disease (PCOD) 2000      Past Surgical History:   Procedure  "Laterality Date     C NONSPECIFIC PROCEDURE  2001, 2004    C/S x2   4/21/01, 1/08/2004     C NONSPECIFIC PROCEDURE  2000    Laparoscopy (endometriosis)  Abstracted 07/16/02     C NONSPECIFIC PROCEDURE  2005    breast augmentation     ECTOPIC PREGNANCY SURGERY  2003    cyst noted in abdomen not tubes     GYN SURGERY Bilateral 11/26/2013    LAPAROSCOPIC HYSTERECTOMY BILATERAL SALPINGECTOMY and cystoscopy;  Surgeon: Vijay Ruiz MD;  Location: [facility] OR - Path: Endometriosis, Adenomyosis, all benign     LAPAROSCOPIC HYSTERECTOMY TOTAL, BILATERAL SALPINGO-OOPHORECTOMY, COMBINED  11/26/2013    Procedure: COMBINED LAPAROSCOPIC HYSTERECTOMY TOTAL, SALPINGO-OOPHORECTOMY;  LAPAROSCOPIC HYSTERECTOMY BILATERAL SALPINGECTOMY and cystoscopy;  Surgeon: Vijay Ruiz MD;  Location: [facility] OR - Path: Endometriosis, Adenomyosis, all benign       Review of Systems   Constitutional: Negative for chills and fever.   HENT: Negative for congestion, ear pain, hearing loss and sore throat.    Eyes: Negative for pain and visual disturbance.   Respiratory: Negative for cough and shortness of breath.    Cardiovascular: Negative for chest pain, palpitations and peripheral edema.   Gastrointestinal: Negative for abdominal pain, constipation, diarrhea, heartburn, hematochezia and nausea.   Breasts:  Negative for tenderness, breast mass and discharge.   Genitourinary: Negative for dysuria, frequency, genital sores, hematuria, pelvic pain, urgency, vaginal bleeding and vaginal discharge.   Musculoskeletal: Negative for arthralgias, joint swelling and myalgias.   Skin: Negative for rash.   Neurological: Negative for dizziness, weakness, headaches and paresthesias.   Psychiatric/Behavioral: Negative for mood changes. The patient is not nervous/anxious.         OBJECTIVE:   BP 97/65 (BP Location: Right arm, Patient Position: Chair, Cuff Size: Adult Regular)   Pulse 62   Temp 98  F (36.7  C) (Oral)   Resp 16   Ht 1.638 m (5' 4.5\")   Wt 53.5 " kg (118 lb)   LMP 11/22/2013   SpO2 100%   BMI 19.94 kg/m       Physical Exam  GENERAL: alert and no distress  EYES: Eyes grossly normal to inspection, PERRL and conjunctivae and sclerae normal  HENT: ear canals and TM's normal, nose and mouth without ulcers or lesions  NECK: no adenopathy, no asymmetry, masses, or scars and thyroid normal to palpation  RESP: lungs clear to auscultation - no rales, rhonchi or wheezes  BREAST: deferred (had mammogram 7/2020)  CV: regular rate and rhythm, normal S1 S2, no S3 or S4, no murmur, click or rub, no peripheral edema and peripheral pulses strong  ABDOMEN: soft, nontender, no hepatosplenomegaly, no masses and bowel sounds normal  MS: no gross musculoskeletal defects noted, no edema  SKIN: no suspicious lesions or rashes  NEURO: Normal strength and tone, mentation intact and speech normal  PSYCH: mentation appears normal, affect normal/bright    Diagnostic Test Results:  Labs reviewed in Epic    ASSESSMENT/PLAN:   1. Encounter for preventive health examination  - 50 year old female  - Comprehensive metabolic panel  - Vitamin D Deficiency    2. Mild intermittent asthma, unspecified whether complicated  - stable  - Rx refills:  - albuterol (PROAIR HFA/PROVENTIL HFA/VENTOLIN HFA) 108 (90 Base) MCG/ACT inhaler; INHALE 2 PUFFS INTO THE LUNGS EVERY 4 HOURS AS NEEDED FOR SHORTNESS OF BREATH  Dispense: 8.5 g; Refill: 5  - budesonide-formoterol (SYMBICORT) 80-4.5 MCG/ACT Inhaler; INHALE 2 PUFFS INTO THE LUNGS TWICE DAILY  Dispense: 10.2 g; Refill: 5  - CBC with platelets differential    3. Screen for colon cancer  - No FH colon cancer  - GASTROENTEROLOGY ADULT REF PROCEDURE ONLY; Future    4. Encounter for lipid screening for cardiovascular disease  - Lipid panel reflex to direct LDL Fasting    COUNSELING:  Reviewed preventive health counseling, as reflected in patient instructions       Regular exercise       Healthy diet/nutrition       Colon cancer screening    Estimated body mass  "index is 19.94 kg/m  as calculated from the following:    Height as of this encounter: 1.638 m (5' 4.5\").    Weight as of this encounter: 53.5 kg (118 lb).         reports that she has never smoked. She has never used smokeless tobacco.      Counseling Resources:  ATP IV Guidelines  Pooled Cohorts Equation Calculator  Breast Cancer Risk Calculator  FRAX Risk Assessment  ICSI Preventive Guidelines  Dietary Guidelines for Americans, 2010  USDA's MyPlate  ASA Prophylaxis  Lung CA Screening    Zoë Brambila CNP  [facility]  "

## 2020-08-03 NOTE — LETTER
August 7, 2020      Jie STEPHENS Art[place]  [address] [facility] [id number]        Dear ,    We are writing to inform you of your test results.    Blood work is essentially ok (no change from previous results).  The glucose (sugar) level has gone back up over 100 (109 H was 96) so watch diet and exercise.     Resulted Orders   Comprehensive metabolic panel   Result Value Ref Range    Sodium 140 133 - 144 mmol/L    Potassium 4.1 3.4 - 5.3 mmol/L    Chloride 112 (H) 94 - 109 mmol/L    Carbon Dioxide 24 20 - 32 mmol/L    Anion Gap 4 3 - 14 mmol/L    Glucose 109 (H) 70 - 99 mg/dL      Comment:      Fasting specimen    Urea Nitrogen 18 7 - 30 mg/dL    Creatinine 0.86 0.52 - 1.04 mg/dL    GFR Estimate 79 >60 mL/min/[1.73_m2]      Comment:      Non  GFR Calc  Starting 12/18/2018, serum creatinine based estimated GFR (eGFR) will be   calculated using the Chronic Kidney Disease Epidemiology Collaboration   (CKD-EPI) equation.      GFR Estimate If Black >90 >60 mL/min/[1.73_m2]      Comment:       GFR Calc  Starting 12/18/2018, serum creatinine based estimated GFR (eGFR) will be   calculated using the Chronic Kidney Disease Epidemiology Collaboration   (CKD-EPI) equation.      Calcium 8.7 8.5 - 10.1 mg/dL    Bilirubin Total 0.6 0.2 - 1.3 mg/dL    Albumin 3.7 3.4 - 5.0 g/dL    Protein Total 7.3 6.8 - 8.8 g/dL    Alkaline Phosphatase 54 40 - 150 U/L    ALT 18 0 - 50 U/L    AST 11 0 - 45 U/L   CBC with platelets differential   Result Value Ref Range    WBC 5.1 4.0 - 11.0 10e9/L    RBC Count 4.43 3.8 - 5.2 10e12/L    Hemoglobin 14.1 11.7 - 15.7 g/dL    Hematocrit 42.2 35.0 - 47.0 %    MCV 95 78 - 100 fl    MCH 31.8 26.5 - 33.0 pg    MCHC 33.4 31.5 - 36.5 g/dL    RDW 12.6 10.0 - 15.0 %    Platelet Count 182 150 - 450 10e9/L    % Neutrophils 46.2 %    % Lymphocytes 40.5 %    % Monocytes 9.0 %    % Eosinophils 3.5 %    % Basophils 0.8 %    Absolute Neutrophil 2.4 1.6 - 8.3 10e9/L     Absolute Lymphocytes 2.1 0.8 - 5.3 10e9/L    Absolute Monocytes 0.5 0.0 - 1.3 10e9/L    Absolute Eosinophils 0.2 0.0 - 0.7 10e9/L    Absolute Basophils 0.0 0.0 - 0.2 10e9/L    Diff Method Automated Method    Lipid panel reflex to direct LDL Fasting   Result Value Ref Range    Cholesterol 201 (H) <200 mg/dL      Comment:      Desirable:       <200 mg/dl    Triglycerides 76 <150 mg/dL      Comment:      Fasting specimen    HDL Cholesterol 82 >49 mg/dL    LDL Cholesterol Calculated 104 (H) <100 mg/dL      Comment:      Above desirable:  100-129 mg/dl  Borderline High:  130-159 mg/dL  High:             160-189 mg/dL  Very high:       >189 mg/dl      Non HDL Cholesterol 119 <130 mg/dL   Vitamin D Deficiency   Result Value Ref Range    Vitamin D Deficiency screening 34 20 - 75 ug/L      Comment:      Season, race, dietary intake, and treatment affect the concentration of   25-hydroxy-Vitamin D. Values may decrease during winter months and increase   during summer months. Values 20-29 ug/L may indicate Vitamin D insufficiency   and values <20 ug/L may indicate Vitamin D deficiency.  Vitamin D determination is routinely performed by an immunoassay specific for   25 hydroxyvitamin D3.  If an individual is on vitamin D2 (ergocalciferol)   supplementation, please specify 25 OH vitamin D2 and D3 level determination by   LCMSMS test [id number].         If you have any questions or concerns, please call the clinic at the number listed above.       Sincerely,        Zoë Brambila, CNP

## 2020-08-04 ENCOUNTER — TELEPHONE (OUTPATIENT)
Dept: INTERNAL MEDICINE | Facility: CLINIC | Age: 50
End: 2020-08-04

## 2020-08-04 DIAGNOSIS — J45.20 INTERMITTENT ASTHMA WITHOUT COMPLICATION, UNSPECIFIED ASTHMA SEVERITY: Primary | ICD-10-CM

## 2020-08-04 LAB — DEPRECATED CALCIDIOL+CALCIFEROL SERPL-MC: 34 UG/L (ref 20–75)

## 2020-08-04 ASSESSMENT — ASTHMA QUESTIONNAIRES: ACT_TOTALSCORE: 16

## 2020-08-04 NOTE — TELEPHONE ENCOUNTER
Please call the patient to inform her that Symbicort is not covered; if she is ok I can change to Advair.  Let me know.

## 2020-08-04 NOTE — TELEPHONE ENCOUNTER
Fax received for alternative RX to non covered budesonide-formoterol (SYMBICORT) 80-4.5 MCG/ACT Inhaler .  The preferred alternative is ADVAIR, BREO ELLIPTA, DULERA.  Please send new RX or advises if PA should be initiated.

## 2020-08-09 NOTE — PROGRESS NOTES
Avoid eating large servings of pasta, potatoes, chips, etc along with food high in sugar.  Eating smaller more frequent meals 3-5 daily can help.  Don't go to bed until after 2 hours of eating.  Moderation is the gave along with walking and exercise.  One does not need a . I am visual person, so protein per meal is a size of a deck of cards, carbs make a fist and that is how much you can eat per meal.  Fruits and vegetables are much better for you so the size is like putting your hand over a tennis ball.  Hope this helps.  Please pass on to the patient.

## 2020-08-13 ENCOUNTER — TELEPHONE (OUTPATIENT)
Dept: INTERNAL MEDICINE | Facility: CLINIC | Age: 50
End: 2020-08-13

## 2020-08-13 NOTE — TELEPHONE ENCOUNTER
"Per lab result message 8-3-20 from patient regarding elevated glucose level:  \"pt is concerned  because she does have a  and she keeps a diary of her food intake. She did have gestational diabetes. Can you advise if there is anything else she could be doing???\"    Per message from CRISTOFER Brambila, in response:  \"Avoid eating large servings of pasta, potatoes, chips, etc along with food high in sugar.  Eating smaller more frequent meals 3-5 daily can help.  Don't go to bed until after 2 hours of eating.  Moderation is the gave along with walking and exercise.  One does not need a . I am visual person, so protein per meal is a size of a deck of cards, carbs make a fist and that is how much you can eat per meal.  Fruits and vegetables are much better for you so the size is like putting your hand over a tennis ball.  Hope this helps.  Please pass on to the patient.\"      Left message for patient to call back.          "

## 2020-11-02 DIAGNOSIS — Z11.59 ENCOUNTER FOR SCREENING FOR OTHER VIRAL DISEASES: Primary | ICD-10-CM

## 2020-11-24 DIAGNOSIS — Z11.59 ENCOUNTER FOR SCREENING FOR OTHER VIRAL DISEASES: ICD-10-CM

## 2020-11-24 PROCEDURE — U0003 INFECTIOUS AGENT DETECTION BY NUCLEIC ACID (DNA OR RNA); SEVERE ACUTE RESPIRATORY SYNDROME CORONAVIRUS 2 (SARS-COV-2) (CORONAVIRUS DISEASE [COVID-19]), AMPLIFIED PROBE TECHNIQUE, MAKING USE OF HIGH THROUGHPUT TECHNOLOGIES AS DESCRIBED BY CMS-2020-01-R: HCPCS | Performed by: SURGERY

## 2020-11-25 LAB
LABORATORY COMMENT REPORT: NORMAL
SARS-COV-2 RNA SPEC QL NAA+PROBE: NEGATIVE
SARS-COV-2 RNA SPEC QL NAA+PROBE: NORMAL
SPECIMEN SOURCE: NORMAL
SPECIMEN SOURCE: NORMAL

## 2020-11-27 ENCOUNTER — HOSPITAL ENCOUNTER (OUTPATIENT)
Facility: CLINIC | Age: 50
Discharge: HOME | End: 2020-11-27
Attending: SURGERY | Admitting: SURGERY
Payer: COMMERCIAL

## 2020-11-27 VITALS
WEIGHT: 116 LBS | RESPIRATION RATE: 18 BRPM | HEIGHT: 65 IN | TEMPERATURE: 98.6 F | DIASTOLIC BLOOD PRESSURE: 56 MMHG | BODY MASS INDEX: 19.33 KG/M2 | OXYGEN SATURATION: 100 % | HEART RATE: 69 BPM | SYSTOLIC BLOOD PRESSURE: 95 MMHG

## 2020-11-27 DIAGNOSIS — Z12.11 SCREENING FOR COLON CANCER: Primary | ICD-10-CM

## 2020-11-27 LAB — COLONOSCOPY: NORMAL

## 2020-11-27 PROCEDURE — 45378 DIAGNOSTIC COLONOSCOPY: CPT | Performed by: SURGERY

## 2020-11-27 PROCEDURE — 99152 MOD SED SAME PHYS/QHP 5/>YRS: CPT | Mod: 59 | Performed by: SURGERY

## 2020-11-27 PROCEDURE — 250N000013 HC RX MED GY IP 250 OP 250 PS 637: Performed by: SURGERY

## 2020-11-27 PROCEDURE — G0105 COLORECTAL SCRN; HI RISK IND: HCPCS | Performed by: SURGERY

## 2020-11-27 PROCEDURE — 250N000011 HC RX IP 250 OP 636: Performed by: SURGERY

## 2020-11-27 PROCEDURE — G0500 MOD SEDAT ENDO SERVICE >5YRS: HCPCS | Performed by: SURGERY

## 2020-11-27 RX ORDER — NALOXONE HYDROCHLORIDE 0.4 MG/ML
0.2 INJECTION, SOLUTION INTRAMUSCULAR; INTRAVENOUS; SUBCUTANEOUS
Status: DISCONTINUED | OUTPATIENT
Start: 2020-11-27 | End: 2020-11-27 | Stop reason: HOSPADM

## 2020-11-27 RX ORDER — FENTANYL CITRATE 50 UG/ML
INJECTION, SOLUTION INTRAMUSCULAR; INTRAVENOUS PRN
Status: DISCONTINUED | OUTPATIENT
Start: 2020-11-27 | End: 2020-11-27 | Stop reason: HOSPADM

## 2020-11-27 RX ORDER — FLUMAZENIL 0.1 MG/ML
0.2 INJECTION, SOLUTION INTRAVENOUS
Status: DISCONTINUED | OUTPATIENT
Start: 2020-11-27 | End: 2020-11-27 | Stop reason: HOSPADM

## 2020-11-27 RX ORDER — NALOXONE HYDROCHLORIDE 0.4 MG/ML
0.4 INJECTION, SOLUTION INTRAMUSCULAR; INTRAVENOUS; SUBCUTANEOUS
Status: DISCONTINUED | OUTPATIENT
Start: 2020-11-27 | End: 2020-11-27 | Stop reason: HOSPADM

## 2020-11-27 RX ORDER — ONDANSETRON 2 MG/ML
4 INJECTION INTRAMUSCULAR; INTRAVENOUS EVERY 6 HOURS PRN
Status: DISCONTINUED | OUTPATIENT
Start: 2020-11-27 | End: 2020-11-27 | Stop reason: HOSPADM

## 2020-11-27 RX ORDER — PROCHLORPERAZINE MALEATE 10 MG
10 TABLET ORAL EVERY 6 HOURS PRN
Status: DISCONTINUED | OUTPATIENT
Start: 2020-11-27 | End: 2020-11-27 | Stop reason: HOSPADM

## 2020-11-27 RX ORDER — SIMETHICONE 20 MG/.3ML
EMULSION ORAL PRN
Status: DISCONTINUED | OUTPATIENT
Start: 2020-11-27 | End: 2020-11-27 | Stop reason: HOSPADM

## 2020-11-27 RX ORDER — LIDOCAINE 40 MG/G
CREAM TOPICAL
Status: DISCONTINUED | OUTPATIENT
Start: 2020-11-27 | End: 2020-11-27 | Stop reason: HOSPADM

## 2020-11-27 RX ORDER — ONDANSETRON 2 MG/ML
4 INJECTION INTRAMUSCULAR; INTRAVENOUS
Status: DISCONTINUED | OUTPATIENT
Start: 2020-11-27 | End: 2020-11-27 | Stop reason: HOSPADM

## 2020-11-27 RX ORDER — ONDANSETRON 4 MG/1
4 TABLET, ORALLY DISINTEGRATING ORAL EVERY 6 HOURS PRN
Status: DISCONTINUED | OUTPATIENT
Start: 2020-11-27 | End: 2020-11-27 | Stop reason: HOSPADM

## 2020-11-27 ASSESSMENT — MIFFLIN-ST. JEOR: SCORE: 1147.05

## 2020-11-27 NOTE — LETTER
November 10, 2020      Jie Siddiqui  [address] [facility] [id number]        Dear Jie,     Please be aware that coverage of these services is subject to the terms and limitations of your health insurance plan.  Call member services at your health plan with any benefit or coverage questions.    Thank you for choosing [facility]. You are scheduled for the following service(s):    Date:  11/27/2020             Procedure:  COLONOSCOPY  Doctor:        Dr. Axel Mariano   Arrival Time:  7:30 am *Enter and check in at the Main Hospital Entrance*  Procedure Time:  8:00 am    Location:   [facility]        Endoscopy Department, First Floor         [address] [place], [place] [address]      [phone] or [phone] ([facility]) to reschedule      MIRALAX -GATORADE  PREP  Colonoscopy is the most accurate test to detect colon polyps and colon cancer; and the only test where polyps can be removed. During this procedure, a doctor examines the lining of your large intestine and rectum through a flexible tube.   Transportation  You must arrange for a ride for the day of your procedure with a responsible adult. A taxi , Uber, etc, is not an option unless you are accompanied by a responsible adult. If you fail to arrange transportation with a responsible adult, your procedure will be cancelled and rescheduled.    Purchase the  following supplies at your local pharmacy:  - 2 (two) bisacodyl tablets: each tablet contains 5 mg.  (Dulcolax  laxative NOT Dulcolax  stool softener)   - 1 (one) 8.3 oz bottle of Polyethylene Glycol (PEG) 3350 Powder   (MiraLAX , Smooth LAX , ClearLAX  or equivalent)  - 64 oz Gatorade    Regular Gatorade, Gatorade G2 , Powerade , Powerade Zero  or Pedialyte  is acceptable. Red colored flavors are not allowed; all other colors (yellow, green, orange, purple and blue) are okay. It is also okay to buy two 2.12 oz packets of powdered  Gatorade that can be mixed with water to a total volume of 64 oz of liquid.  - 1 (one) 10 oz bottle of Magnesium Citrate (Red colored flavors are not allowed)  It is also okay for you to use a 0.5 oz package of powdered magnesium citrate (17 g) mixed with 10 oz of water.      PREPARATION FOR COLONOSCOPY    7 days before:    Discontinue fiber supplements and medications containing iron. This includes Metamucil  and Fibercon ; and multivitamins with iron.    3 days before:    Begin a low-fiber diet. A low-fiber diet helps making the cleanout more effective.     Examples of a low-fiber diet include (but are not limited to): white bread, white rice, pasta, crackers, fish, chicken, eggs, ground beef, creamy peanut butter, cooked/steamed/boiled vegetables, canned fruit, bananas, melons, milk, plain yogurt cheese, salad dressing and other condiments.     The following are not allowed on a low-fiber diet: seeds, nuts, popcorn, bran, whole wheat, corn, quinoa, raw fruits and vegetables, berries and dried fruit, beans and lentils.    For additional details on low-fiber diet, please refer to the table on the last page.    2 days before:    Continue the low-fiber diet.     Drink at least 8 glasses of water throughout the day.     Stop eating solid foods at 11:45 pm.    1 day before:    In the morning: begin a clear liquid diet (liquids you can see through).     Examples of a clear liquid diet include: water, clear broth or bouillon, Gatorade, Pedialyte or Powerade, carbonated and non-carbonated soft drinks (Sprite , 7-Up , ginger ale), strained fruit juices without pulp (apple, white grape, white cranberry), Jell-O  and popsicles.     The following are not allowed on a clear liquid diet: red liquids, alcoholic beverages, dairy products (milk, creamer, and yogurt), protein shakes, creamy broths, juice with pulp and chewing tobacco.    At noon: take 2 (two) bisacodyl tablets     At 4 (and no later than 6pm): start drinking the  Miralax-Gatorade preparation (8.3 oz of Miralax mixed with 64 oz of Gatorade in a large pitcher). Drink 1(one) 8 oz glass every 15 minutes thereafter, until the mixture is gone.    COLON CLEANSING TIPS: drink adequate amounts of fluids before and after your colon cleansing to prevent dehydration. Stay near a toilet because you will have diarrhea. Even if you are sitting on the toilet, continue to drink the cleansing solution every 15 minutes. If you feel nauseous or vomit, rinse your mouth with water, take a 15 to 30-minute-break and then continue drinking the solution. You will be uncomfortable until the stool has flushed from your colon (in about 2 to 4 hours). You may feel chilled.    Day of your procedure  You may take all of your morning medications including blood pressure medications, blood thinners (if you have not been instructed to stop these by our office), methadone, anti-seizure medications with sips of water 3 hours prior to your procedure or earlier. Do not take insulin or vitamins prior to your procedure. Continue the clear liquid diet.       4 hours prior: drink 10 oz of magnesium citrate. It may be easier to drink it with a straw.    STOP consuming all liquids after that.     Do not take anything by mouth during this time.     Allow extra time to travel to your procedure as you may need to stop and use a restroom along the way.    You are ready for the procedure, if you followed all instructions and your stool is no longer formed, but clear or yellow liquid. If you are unsure whether your colon is clean, please call our office at [phone] before you leave for your appointment.    Bring the following to your procedure:  - Insurance Card/Photo ID.   - List of current medications including over-the-counter medications and supplements.   - Your rescue inhaler if you currently use one to control asthma.    Canceling or rescheduling your appointment:   If you must cancel or reschedule your  appointment, please call [phone] as soon as possible.      COLONOSCOPY PRE-PROCEDURE CHECKLIST    If you have diabetes, ask your regular doctor for diet and medication restrictions.  If you take an anticoagulant or anti-platelet medication (such as Coumadin , Lovenox , Pradaxa , Xarelto , Eliquis , etc.), please call your primary doctor for advice on holding this medication.  If you take aspirin you may continue to do so.  If you are or may be pregnant, please discuss the risks and benefits of this procedure with your doctor.        What happens during a colonoscopy?    Plan to spend up to two hours, starting at registration time, at the endoscopy center the day of your procedure. The colonoscopy takes an average of 15 to 30 minutes. Recovery time is about 30 minutes.      Before the exam:    You will change into a gown.    Your medical history and medication list will be reviewed with you, unless that has been done over the phone prior to the procedure.     A nurse will insert an intravenous (IV) line into your hand or arm.    The doctor will meet with you and will give you a consent form to sign.  During the exam:     Medicine will be given through the IV line to help you relax.     Your heart rate and oxygen levels will be monitored. If your blood pressure is low, you may be given fluids through the IV line.     The doctor will insert a flexible hollow tube, called a colonoscope, into your rectum. The scope will be advanced slowly through the large intestine (colon).    You may have a feeling of fullness or pressure.     If an abnormal tissue or a polyp is found, the doctor may remove it through the endoscope for closer examination, or biopsy. Tissue removal is painless    After the exam:           Any tissue samples removed during the exam will be sent to a lab for evaluation. It may take 5-7 working days for you to be notified of the results.     A nurse will provide you with complete discharge  instructions before you leave the endoscopy center. Be sure to ask the nurse for specific instructions if you take blood thinners such as Aspirin, Coumadin or Plavix.     The doctor will prepare a full report for you and for the physician who referred you for the procedure.     Your doctor will talk with you about the initial results of your exam.      Medication given during the exam will prohibit you from driving for the rest of the day.     Following the exam, you may resume your normal diet. Your first meal should be light, no greasy foods. Avoid alcohol until the next day.     You may resume your regular activities the day after the procedure.         LOW-FIBER DIET    Foods RECOMMENDED Foods to AVOID   Breads, Cereal, Rice and Pasta:   White bread, rolls, biscuits, croissant and ruth toast.   Waffles, Syriac toast and pancakes.   White rice, noodles, pasta, macaroni and peeled cooked potatoes.   Plain crackers and saltines.   Cooked cereals: farina, cream of rice.   Cold cereals: Puffed Rice , Rice Krispies , Corn Flakes  and Special K    Breads, Cereal, Rice and Pasta:   Breads or rolls with nuts, seeds or fruit.   Whole wheat, pumpernickel, rye breads and cornbread.   Potatoes with skin, brown or wild rice, and kasha (buckwheat).     Vegetables:   Tender cooked and canned vegetables without seeds: carrots, asparagus tips, green or wax beans, pumpkin, spinach, lima beans. Vegetables:   Raw or steamed vegetables.   Vegetables with seeds.   Sauerkraut.   Winter squash, peas, broccoli, Brussel sprouts, cabbage, onions, cauliflower, baked beans, peas and corn.   Fruits:   Strained fruit juice.   Canned fruit, except pineapple.   Ripe bananas and melon. Fruits:   Prunes and prune juice.   Raw fruits.   Dried fruits: figs, dates and raisins.   Milk/Dairy:   Milk: plain or flavored.   Yogurt, custard and ice cream.   Cheese and cottage cheese Milk/Dairy:     Meat and other proteins:   ground, well-cooked tender  beef, lamb, ham, veal, pork, fish, poultry and organ meats.   Eggs.   Peanut butter without nuts. Meat and other proteins:   Tough, fibrous meats with gristle.   Dry beans, peas and lentils.   Peanut butter with nuts.   Tofu.   Fats, Snack, Sweets, Condiments and Beverages:   Margarine, butter, oils, mayonnaise, sour cream and salad dressing, plain gravy.   Sugar, hard candy, clear jelly, honey and syrup.   Spices, cooked herbs, bouillon, broth and soups made with allowed vegetable, ketchup and mustard.   Coffee, tea and carbonated drinks.   Plain cakes, cookies and pretzels.   Gelatin, plain puddings, custard, ice cream, sherbet and popsicles. Fats, Snack, Sweets, Condiments and Beverages:   Nuts, seeds and coconut.   Jam, marmalade and preserves.   Pickles, olives, relish and horseradish.   All desserts containing nuts, seeds, dried fruit and coconut; or made from whole grains or bran.   Candy made with nuts or seeds.   Popcorn.         DIRECTIONS TO THE ENDOSCOPY DEPARTMENT    From the north ([facility])  Take [address] South, exit on [address] [address]. Get into the left hand [place], turn left (east), go one-half mile to [address] and turn left. Go north to the second stoplight, take a right on [address] and follow it to the Main Hospital entrance.    From the south ([facility])  Take 35N to the 35E split and exit on [address] [address]. On [address] [address], turn left (west) to [address]. Turn right (north) on [address]. Go north to the second stoplight, take a right on [address] and follow it to the Main Hospital entrance.    From the east via 35E ([facility])  Take [address]E south to [address] [address] exit. Turn right on [address] [address]. Go west to [address]. Turn right (north) on [address]. Go to the second stoplight, take a right on [address] to the Main Hospital entrance.    From the east via [address] ([facility])  Take [facility] [address]  West to [address]. Turn left (south) on [address] to [address], turn left (east) on [address] and follow it to the Main Hospital entrance.    From the west via [address] ([place], [place])  Take [address] east to [address]. Turn right (south) on [address] to [address], turn left (east) on [address] and follow it to the Main Hospital entrance.

## 2020-11-27 NOTE — H&P
Pre-Endoscopy History and Physical     Jie Siddiqui MRN# [id number]   YOB: 1970 Age: 50 year old     Date of Procedure: 11/27/2020  Primary care provider: Zoë Brambila  Type of Endoscopy: colonoscopy  Reason for Procedure: screening  Type of Anesthesia Anticipated: Moderate Sedation    HPI:    Jie is a 50 year old female who will be undergoing the above procedure.      A history and physical has been performed. The patient's medications and allergies have been reviewed. The risks and benefits of the procedure and the sedation options and risks were discussed with the patient.  All questions were answered and informed consent was obtained.      She denies a personal or family history of anesthesia complications or bleeding disorders.     Allergies   Allergen Reactions     Erythromycin Hives     Morphine Hives               Prior to Admission Medications   Prescriptions Last Dose Informant Patient Reported? Taking?   Probiotic Product (PROBIOTIC DAILY PO) Past Week at Unknown time  Yes Yes   VITAMIN D, CHOLECALCIFEROL, PO Past Week at Unknown time  Yes Yes   Sig: Take 5,000 Units by mouth daily   albuterol (PROAIR HFA/PROVENTIL HFA/VENTOLIN HFA) 108 (90 Base) MCG/ACT inhaler Past Week at Unknown time  No Yes   Sig: INHALE 2 PUFFS INTO THE LUNGS EVERY 4 HOURS AS NEEDED FOR SHORTNESS OF BREATH   budesonide-formoterol (SYMBICORT) 80-4.5 MCG/ACT Inhaler 11/27/2020 at Unknown time  No Yes   Sig: INHALE 2 PUFFS INTO THE LUNGS TWICE DAILY   fluticasone (FLONASE) 50 MCG/ACT nasal spray   No No   Sig: Spray 1 spray into both nostrils 2 times daily as needed for rhinitis or allergies   fluticasone-salmeterol (ADVAIR) 250-50 MCG/DOSE inhaler Past Week at Unknown time  No Yes   Sig: Inhale 1 puff into the lungs every 12 hours   ibuprofen (ADVIL,MOTRIN) 200 MG tablet Past Week at Unknown time  Yes Yes   Sig: Take 1 tablet by mouth every 4 hours as needed for pain.   loratadine (CLARITIN) 10 MG tablet Past Week  at Unknown time  Yes Yes   Sig: Take 10 mg by mouth daily      Facility-Administered Medications: None       Patient Active Problem List   Diagnosis     CARDIOVASCULAR SCREENING; LDL GOAL LESS THAN 160     Seasonal allergies     Intermittent asthma     Endometriosis     Polycystic ovaries     Anxiety        Past Medical History:   Diagnosis Date     Asthma lifetime     Endometriosis 2000     Polycystic ovarian disease (PCOD) 2000        Past Surgical History:   Procedure Laterality Date     ECTOPIC PREGNANCY SURGERY  2003    cyst noted in abdomen not tubes     GYN SURGERY Bilateral 11/26/2013    LAPAROSCOPIC HYSTERECTOMY BILATERAL SALPINGECTOMY and cystoscopy;  Surgeon: Vijay Ruiz MD;  Location: [facility] OR - Path: Endometriosis, Adenomyosis, all benign     LAPAROSCOPIC HYSTERECTOMY TOTAL, BILATERAL SALPINGO-OOPHORECTOMY, COMBINED  11/26/2013    Procedure: COMBINED LAPAROSCOPIC HYSTERECTOMY TOTAL, SALPINGO-OOPHORECTOMY;  LAPAROSCOPIC HYSTERECTOMY BILATERAL SALPINGECTOMY and cystoscopy;  Surgeon: Vijay Ruiz MD;  Location: [facility] OR - Path: Endometriosis, Adenomyosis, all benign     [facility] NONSPECIFIC PROCEDURE  2001, 2004    C/S x2   4/21/01, 1/08/2004     [facility] NONSPECIFIC PROCEDURE  2000    Laparoscopy (endometriosis)  Abstracted 07/16/02     [facility] NONSPECIFIC PROCEDURE  2005    breast augmentation       Social History     Tobacco Use     Smoking status: Never Smoker     Smokeless tobacco: Never Used   Substance Use Topics     Alcohol use: Yes     Alcohol/week: 2.0 standard drinks     Types: 1 Glasses of wine, 1 Shots of liquor per week     Frequency: 2-3 times a week     Drinks per session: 1 or 2     Binge frequency: Never     Comment: occasional wine 1x weekly       Family History   Problem Relation Age of Onset     Lipids Father      Lung Cancer Mother 76        smoker     Liver Cancer Mother      Cancer Paternal Grandmother         breast cancer     Cerebrovascular Disease Paternal Grandmother      Lipids  "Paternal Grandmother      Breast Cancer Paternal Grandmother      Psychotic Disorder Maternal Aunt         bi polar     Asthma No family hx of      C.A.D. No family hx of      Diabetes No family hx of      Hypertension No family hx of      Cancer - colorectal No family hx of      Prostate Cancer No family hx of      Alcohol/Drug No family hx of      Allergies No family hx of      Alzheimer Disease No family hx of      Anesthesia Reaction No family hx of      Arthritis No family hx of      Blood Disease No family hx of      Cardiovascular No family hx of      Colon Cancer No family hx of        REVIEW OF SYSTEMS:     5 point ROS negative except as noted above in HPI, including Gen., Resp., CV, GI &  system review.      PHYSICAL EXAM:   BP 99/61   Pulse 86   Temp 98.6  F (37  C) (Temporal)   Resp 21   Ht 1.651 m (5' 5\")   Wt 52.6 kg (116 lb)   LMP 11/22/2013   SpO2 100%   BMI 19.30 kg/m   Estimated body mass index is 19.3 kg/m  as calculated from the following:    Height as of this encounter: 1.651 m (5' 5\").    Weight as of this encounter: 52.6 kg (116 lb).   GENERAL APPEARANCE: healthy, alert and no distress  MENTAL STATUS: alert  AIRWAY EXAM: Mallampatti left mask on  RESP: lungs clear to auscultation - no rales, rhonchi or wheezes  CV: regular rates and rhythm      DIAGNOSTICS:    Not indicated      IMPRESSION   ASA Class 2 - Mild systemic disease        PLAN:       Plan for colonoscopy. We discussed the risks, benefits and alternatives and the patient wished to proceed.    The above has been forwarded to the consulting provider.      Signed Electronically by: Axel Mariano MD  November 27, 2020    "

## 2021-01-29 ENCOUNTER — AMBULATORY - HEALTHEAST (OUTPATIENT)
Dept: NURSING | Facility: CLINIC | Age: 51
End: 2021-01-29

## 2021-03-05 ENCOUNTER — AMBULATORY - HEALTHEAST (OUTPATIENT)
Dept: NURSING | Facility: CLINIC | Age: 51
End: 2021-03-05

## 2021-03-05 DIAGNOSIS — Z23 ENCOUNTER FOR IMMUNIZATION: ICD-10-CM

## 2021-08-06 ENCOUNTER — HOSPITAL ENCOUNTER (OUTPATIENT)
Dept: MAMMOGRAPHY | Facility: CLINIC | Age: 51
Discharge: HOME | End: 2021-08-06
Attending: OBSTETRICS & GYNECOLOGY | Admitting: OBSTETRICS & GYNECOLOGY
Payer: COMMERCIAL

## 2021-08-06 DIAGNOSIS — Z12.31 OTHER SCREENING MAMMOGRAM: ICD-10-CM

## 2021-08-06 PROCEDURE — 77067 SCR MAMMO BI INCL CAD: CPT

## 2021-09-18 ENCOUNTER — HEALTH MAINTENANCE LETTER (OUTPATIENT)
Age: 51
End: 2021-09-18

## 2021-09-20 ENCOUNTER — MYC MEDICAL ADVICE (OUTPATIENT)
Dept: INTERNAL MEDICINE | Facility: CLINIC | Age: 51
End: 2021-09-20

## 2021-09-20 DIAGNOSIS — J45.20 INTERMITTENT ASTHMA WITHOUT COMPLICATION, UNSPECIFIED ASTHMA SEVERITY: ICD-10-CM

## 2021-09-21 NOTE — TELEPHONE ENCOUNTER
Next 5 appointments (look out 90 days)    Nov 04, 2021  3:50 PM  PHYSICAL with Zoë Brambila CNP  [facility] ([facility] ) [address]  [facility] [id number] [phone]        Medication is being filled for 1 time refill only due to:  Patient needs to be seen because it has been more than one year since last visit.

## 2021-10-31 ASSESSMENT — ENCOUNTER SYMPTOMS
ABDOMINAL PAIN: 0
PALPITATIONS: 0
DIARRHEA: 0
HEADACHES: 0
HEMATOCHEZIA: 0
CHILLS: 0
HEARTBURN: 0
FEVER: 0
ARTHRALGIAS: 0
PARESTHESIAS: 0
EYE PAIN: 0
WEAKNESS: 0
SORE THROAT: 0
CONSTIPATION: 0
MYALGIAS: 0
COUGH: 0
JOINT SWELLING: 0
BREAST MASS: 0
SHORTNESS OF BREATH: 0
HEMATURIA: 0
DYSURIA: 0
NERVOUS/ANXIOUS: 0
NAUSEA: 0
FREQUENCY: 0
DIZZINESS: 0

## 2021-11-04 ENCOUNTER — OFFICE VISIT (OUTPATIENT)
Dept: INTERNAL MEDICINE | Facility: CLINIC | Age: 51
End: 2021-11-04
Payer: COMMERCIAL

## 2021-11-04 VITALS
TEMPERATURE: 98.5 F | BODY MASS INDEX: 20.83 KG/M2 | SYSTOLIC BLOOD PRESSURE: 98 MMHG | DIASTOLIC BLOOD PRESSURE: 58 MMHG | RESPIRATION RATE: 20 BRPM | WEIGHT: 122 LBS | HEART RATE: 108 BPM | HEIGHT: 64 IN | OXYGEN SATURATION: 96 %

## 2021-11-04 DIAGNOSIS — Z23 ENCOUNTER FOR IMMUNIZATION: ICD-10-CM

## 2021-11-04 DIAGNOSIS — J45.20 MILD INTERMITTENT ASTHMA, UNSPECIFIED WHETHER COMPLICATED: ICD-10-CM

## 2021-11-04 DIAGNOSIS — J45.20 INTERMITTENT ASTHMA WITHOUT COMPLICATION, UNSPECIFIED ASTHMA SEVERITY: ICD-10-CM

## 2021-11-04 DIAGNOSIS — Z00.00 ROUTINE GENERAL MEDICAL EXAMINATION AT A HEALTH CARE FACILITY: Primary | ICD-10-CM

## 2021-11-04 PROCEDURE — 90715 TDAP VACCINE 7 YRS/> IM: CPT | Performed by: NURSE PRACTITIONER

## 2021-11-04 PROCEDURE — 99396 PREV VISIT EST AGE 40-64: CPT | Mod: 25 | Performed by: NURSE PRACTITIONER

## 2021-11-04 PROCEDURE — 90471 IMMUNIZATION ADMIN: CPT | Performed by: NURSE PRACTITIONER

## 2021-11-04 PROCEDURE — 99213 OFFICE O/P EST LOW 20 MIN: CPT | Mod: 25 | Performed by: NURSE PRACTITIONER

## 2021-11-04 RX ORDER — MONTELUKAST SODIUM 10 MG/1
10 TABLET ORAL AT BEDTIME
Qty: 30 TABLET | Refills: 5 | Status: SHIPPED | OUTPATIENT
Start: 2021-11-04 | End: 2022-12-02

## 2021-11-04 RX ORDER — ALBUTEROL SULFATE 90 UG/1
AEROSOL, METERED RESPIRATORY (INHALATION)
Qty: 8.5 G | Refills: 5 | Status: SHIPPED | OUTPATIENT
Start: 2021-11-04 | End: 2024-04-17

## 2021-11-04 ASSESSMENT — ENCOUNTER SYMPTOMS
HEMATOCHEZIA: 0
HEARTBURN: 0
HEMATURIA: 0
BREAST MASS: 0
MYALGIAS: 0
WEAKNESS: 0
COUGH: 0
NERVOUS/ANXIOUS: 0
ABDOMINAL PAIN: 0
DIZZINESS: 0
CONSTIPATION: 0
SHORTNESS OF BREATH: 0
JOINT SWELLING: 0
DYSURIA: 0
ARTHRALGIAS: 0
FREQUENCY: 0
NAUSEA: 0
DIARRHEA: 0
PALPITATIONS: 0
FEVER: 0
CHILLS: 0
HEADACHES: 0
EYE PAIN: 0
SORE THROAT: 0
PARESTHESIAS: 0

## 2021-11-04 ASSESSMENT — MIFFLIN-ST. JEOR: SCORE: 1153.39

## 2021-11-04 NOTE — NURSING NOTE
Prior to injection, verified patient identity using patient's name and date of birth.  Due to injection administration, patient instructed to remain in clinic for 15 minutes afterwards, and to report any adverse reaction to me or the  staff immediately.      Drug Amount Wasted:  None.  Vial/Syringe: Syringe  Expiration Date:  4/27/23    Screening Questionnaire for Adult Immunization     Are you sick today?   No    Do you have allergies to medications, food or any vaccine?   No    Have you ever had a serious reaction after receiving a vaccination?   No    Do you have a long-term health problem with heart disease, lung disease,  asthma, kidney disease, diabetes, anemia, metabolic or blood disease?   No    Do you have cancer, leukemia, AIDS, or any immune system problem?   No    Do you take cortisone, prednisone, other steroids, or anticancer drugs, or  have you had any x-ray (radiation) treatments?   No    Have you had a seizure, brain, or other nervous system problem?   No    During the past year, have you received a transfusion of blood or blood       products, or been given a medicine called immune (gamma) globulin?   No    For women: Are you pregnant or is there a chance you could become         pregnant during the next month?   No    Have you received any vaccinations in the past 4 weeks?   No     Immunization questionnaire answers were all negative.      MNVFC doesn't apply on this patient     Screening performed by Beverley Hobson MA on 11/4/2021 at 4:29 PM.

## 2021-11-04 NOTE — PATIENT INSTRUCTIONS
Physical exam completed    Given Tetanus vaccine today.    No labs this year but will need next year.      Preventive Health Recommendations  Female Ages 50 - 64    Yearly exam: See your health care provider every year in order to  o Review health changes.   o Discuss preventive care.    o Review your medicines if your doctor has prescribed any.      Get a Pap test every three years (unless you have an abnormal result and your provider advises testing more often).    If you get Pap tests with HPV test, you only need to test every 5 years, unless you have an abnormal result.     You do not need a Pap test if your uterus was removed (hysterectomy) and you have not had cancer.    You should be tested each year for STDs (sexually transmitted diseases) if you're at risk.     Have a mammogram every 1 to 2 years.    Have a colonoscopy at age 50, or have a yearly FIT test (stool test). These exams screen for colon cancer.      Have a cholesterol test every 5 years, or more often if advised.    Have a diabetes test (fasting glucose) every three years. If you are at risk for diabetes, you should have this test more often.     If you are at risk for osteoporosis (brittle bone disease), think about having a bone density scan (DEXA).    Shots: Get a flu shot each year. Get a tetanus shot every 10 years.    Nutrition:     Eat at least 5 servings of fruits and vegetables each day.    Eat whole-grain bread, whole-wheat pasta and brown rice instead of white grains and rice.    Get adequate Calcium and Vitamin D.     Lifestyle    Exercise at least 150 minutes a week (30 minutes a day, 5 days a week). This will help you control your weight and prevent disease.    Limit alcohol to one drink per day.    No smoking.     Wear sunscreen to prevent skin cancer.     See your dentist every six months for an exam and cleaning.    See your eye doctor every 1 to 2 years.

## 2021-11-04 NOTE — PROGRESS NOTES
SUBJECTIVE:   CC: Jie Siddiqui is an 51 year old woman who presents for preventive health visit.       Patient has been advised of split billing requirements and indicates understanding: Yes     Healthy Habits:     Getting at least 3 servings of Calcium per day:  Yes    Bi-annual eye exam:  Yes    Dental care twice a year:  Yes    Sleep apnea or symptoms of sleep apnea:  None    Diet:  Regular (no restrictions)    Frequency of exercise:  1 day/week    Duration of exercise:  15-30 minutes    Taking medications regularly:  Yes    Medication side effects:  Not applicable    PHQ-2 Total Score: 0    Additional concerns today:  No      Today's PHQ-2 Score:   PHQ-2 ( 1999 [employer]) 10/31/2021   Q1: Little interest or pleasure in doing things 0   Q2: Feeling down, depressed or hopeless 0   PHQ-2 Score 0   Q1: Little interest or pleasure in doing things Not at all   Q2: Feeling down, depressed or hopeless Not at all   PHQ-2 Score 0       Abuse: Current or Past (Physical, Sexual or Emotional) - No  Do you feel safe in your environment? Yes    Have you ever done Advance Care Planning? (For example, a Health Directive, POLST, or a discussion with a medical provider or your loved ones about your wishes): No, advance care planning information given to patient to review.  Advanced care planning was discussed at today's visit.     Here for physical exam.  Not due for mammogram until after 8/6/2022 as had a recent one on 8/2021 (ok); and no longer needing pap due to partial hysterectomy.  Last labs done 8/3/2020 showed D ok, Lipid with  H and chol 201 H, CBC ok, and CMP with glucose 109 H.    Overall doing ok. No fever.  Clearing throat a lot and having to take Claritin daily + using the inhalers for my asthma. No wheezing.  No shortness of breathe or chest pain.  No stomach or urination issues.  No joint pain; I work out as much as I can.  Working on my OT doctorial program and should be done in 5/2022.    Social History      Tobacco Use     Smoking status: Never Smoker     Smokeless tobacco: Never Used   Substance Use Topics     Alcohol use: Yes     Alcohol/week: 2.0 standard drinks     Comment: occasional wine 1x weekly     If you drink alcohol do you typically have >3 drinks per day or >7 drinks per week? Yes      Alcohol Use 10/31/2021   Prescreen: >3 drinks/day or >7 drinks/week? No   Prescreen: >3 drinks/day or >7 drinks/week? -   No flowsheet data found.    Reviewed orders with patient.  Reviewed health maintenance and updated orders accordingly - Yes  Labs reviewed in EPIC  Patient Active Problem List   Diagnosis     CARDIOVASCULAR SCREENING; LDL GOAL LESS THAN 160     Seasonal allergies     Intermittent asthma     Endometriosis     Polycystic ovaries     Anxiety     Past Surgical History:   Procedure Laterality Date     COLONOSCOPY N/A 11/27/2020    Procedure: COLONOSCOPY (fv);  Surgeon: Axel Mariano MD;  Location: [facility]     ECTOPIC PREGNANCY SURGERY  2003    cyst noted in abdomen not tubes     GYN SURGERY Bilateral 11/26/2013    LAPAROSCOPIC HYSTERECTOMY BILATERAL SALPINGECTOMY and cystoscopy;  Surgeon: Vijay Ruiz MD;  Location: [facility] OR - Path: Endometriosis, Adenomyosis, all benign     LAPAROSCOPIC HYSTERECTOMY TOTAL, BILATERAL SALPINGO-OOPHORECTOMY, COMBINED  11/26/2013    Procedure: COMBINED LAPAROSCOPIC HYSTERECTOMY TOTAL, SALPINGO-OOPHORECTOMY;  LAPAROSCOPIC HYSTERECTOMY BILATERAL SALPINGECTOMY and cystoscopy;  Surgeon: Vijay Ruiz MD;  Location: [facility] OR - Path: Endometriosis, Adenomyosis, all benign     [facility] NONSPECIFIC PROCEDURE  2001, 2004    C/S x2   4/21/01, 1/08/2004     [facility] NONSPECIFIC PROCEDURE  2000    Laparoscopy (endometriosis)  Abstracted 07/16/02     ZZ NONSPECIFIC PROCEDURE  2005    breast augmentation       Social History     Tobacco Use     Smoking status: Never Smoker     Smokeless tobacco: Never Used   Substance Use Topics     Alcohol use: Yes     Alcohol/week: 2.0 standard  drinks     Comment: occasional wine 1x weekly     Family History   Problem Relation Age of Onset     Lipids Father      Prostate Cancer Father      Lung Cancer Mother 76        smoker     Liver Cancer Mother      Cancer Paternal Grandmother         breast cancer     Cerebrovascular Disease Paternal Grandmother      Lipids Paternal Grandmother      Breast Cancer Paternal Grandmother      Psychotic Disorder Maternal Aunt         bi polar     Cerebrovascular Disease Paternal Grandfather      Asthma Paternal Grandfather      Mental Illness Other      Asthma No family hx of      C.A.D. No family hx of      Diabetes No family hx of      Hypertension No family hx of      Cancer - colorectal No family hx of      Prostate Cancer No family hx of      Alcohol/Drug No family hx of      Allergies No family hx of      Alzheimer Disease No family hx of      Anesthesia Reaction No family hx of      Arthritis No family hx of      Blood Disease No family hx of      Cardiovascular No family hx of      Colon Cancer No family hx of          Current Outpatient Medications   Medication Sig Dispense Refill     albuterol (PROAIR HFA/PROVENTIL HFA/VENTOLIN HFA) 108 (90 Base) MCG/ACT inhaler INHALE 2 PUFFS INTO THE LUNGS EVERY 4 HOURS AS NEEDED FOR SHORTNESS OF BREATH 8.5 g 5     fluticasone (FLONASE) 50 MCG/ACT nasal spray Spray 1 spray into both nostrils 2 times daily as needed for rhinitis or allergies 9.9 mL 3     fluticasone-salmeterol (ADVAIR) 250-50 MCG/DOSE inhaler Inhale 1 puff into the lungs every 12 hours 1 each 5     loratadine (CLARITIN) 10 MG tablet Take 10 mg by mouth daily       montelukast (SINGULAIR) 10 MG tablet Take 1 tablet (10 mg) by mouth At Bedtime 30 tablet 5     Probiotic Product (PROBIOTIC DAILY PO)        VITAMIN D, CHOLECALCIFEROL, PO Take 5,000 Units by mouth daily       ibuprofen (ADVIL,MOTRIN) 200 MG tablet Take 1 tablet by mouth every 4 hours as needed for pain. (Patient not taking: Reported on 11/4/2021) 100  tablet 3     Allergies   Allergen Reactions     Erythromycin Hives     Morphine Hives              Breast Cancer Screening:  Any new diagnosis of family breast, ovarian, or bowel cancer? No    FHS-7:   Breast CA Risk Assessment (FHS-7) 8/6/2021   Did any of your first-degree relatives have breast or ovarian cancer? No   Did any of your relatives have bilateral breast cancer? No   Did any man in your family have breast cancer? No   Did any woman in your family have breast and ovarian cancer? Yes   Did any woman in your family have breast cancer before age 50 y? No   Do you have 2 or more relatives with breast and/or ovarian cancer? No   Do you have 2 or more relatives with breast and/or bowel cancer? No       Mammogram Screening: Recommended annual mammography  Pertinent mammograms are reviewed under the imaging tab.    History of abnormal Pap smear: Not needed. Had partial hysterectomy  PAP / HPV 10/29/2012 10/17/2011 12/6/2010   PAP (Historical) NIL NIL NIL     Reviewed and updated as needed this visit by clinical staff  Tobacco  Allergies  Meds   Med Hx  Surg Hx  Fam Hx  Soc Hx        Reviewed and updated as needed this visit by Provider                Past Medical History:   Diagnosis Date     Asthma lifetime     Diabetes (H) gestational     Endometriosis 2000     Polycystic ovarian disease (PCOD) 2000     Thyroid disease related to pregnancy      Past Surgical History:   Procedure Laterality Date     COLONOSCOPY N/A 11/27/2020    Procedure: COLONOSCOPY (fv);  Surgeon: Axel Mariano MD;  Location:  GI     ECTOPIC PREGNANCY SURGERY  2003    cyst noted in abdomen not tubes     GYN SURGERY Bilateral 11/26/2013    LAPAROSCOPIC HYSTERECTOMY BILATERAL SALPINGECTOMY and cystoscopy;  Surgeon: Vijay Ruiz MD;  Location:  OR - Path: Endometriosis, Adenomyosis, all benign     LAPAROSCOPIC HYSTERECTOMY TOTAL, BILATERAL SALPINGO-OOPHORECTOMY, COMBINED  11/26/2013    Procedure: COMBINED LAPAROSCOPIC  "HYSTERECTOMY TOTAL, SALPINGO-OOPHORECTOMY;  LAPAROSCOPIC HYSTERECTOMY BILATERAL SALPINGECTOMY and cystoscopy;  Surgeon: Vijay Ruiz MD;  Location:  OR - Path: Endometriosis, Adenomyosis, all benign     [facility] NONSPECIFIC PROCEDURE  2001, 2004    C/S x2   4/21/01, 1/08/2004     [facility] NONSPECIFIC PROCEDURE  2000    Laparoscopy (endometriosis)  Abstracted 07/16/02     [facility] NONSPECIFIC PROCEDURE  2005    breast augmentation       Review of Systems   Constitutional: Negative for chills and fever.   HENT: Negative for congestion, ear pain, hearing loss and sore throat.    Eyes: Negative for pain and visual disturbance.   Respiratory: Negative for cough and shortness of breath.    Cardiovascular: Negative for chest pain, palpitations and peripheral edema.   Gastrointestinal: Negative for abdominal pain, constipation, diarrhea, heartburn, hematochezia and nausea.   Breasts:  Negative for tenderness, breast mass and discharge.   Genitourinary: Negative for dysuria, frequency, genital sores, hematuria, pelvic pain, urgency, vaginal bleeding and vaginal discharge.   Musculoskeletal: Negative for arthralgias, joint swelling and myalgias.   Skin: Negative for rash.   Neurological: Negative for dizziness, weakness, headaches and paresthesias.   Psychiatric/Behavioral: Negative for mood changes. The patient is not nervous/anxious.           OBJECTIVE:   BP 98/58 (BP Location: Left arm, Patient Position: Sitting, Cuff Size: Adult Regular)   Pulse 108   Temp 98.5  F (36.9  C) (Tympanic)   Resp 20   Ht 1.626 m (5' 4\")   Wt 55.3 kg (122 lb)   LMP 11/22/2013   SpO2 96%   BMI 20.94 kg/m       Physical Exam  GENERAL: alert and no distress  EYES: Eyes grossly normal to inspection, PERRL and conjunctivae and sclerae normal  HENT: ear canals and TM's normal with small scab with some wax in right ear, clears throat alot  NECK: no adenopathy, no asymmetry, masses, or scars and thyroid normal to palpation  RESP: lungs clear to " "auscultation - no rales, rhonchi or wheezes  BREAST: discussed self breast exam (had mammogram in 8/2021)  CV: regular rate and rhythm, normal S1 S2, no S3 or S4, no murmur, click or rub, no peripheral edema and peripheral pulses strong  ABDOMEN: soft, nontender, no hepatosplenomegaly, no masses and bowel sounds normal  MS: no gross musculoskeletal defects noted, no edema  SKIN: no suspicious lesions or rashes  NEURO: Normal strength and tone, mentation intact and speech normal  PSYCH: mentation appears normal, affect normal/bright    Diagnostic Test Results:  Labs reviewed in Epic    ASSESSMENT/PLAN:   (Z00.00) Routine general medical examination at a health care facility  (primary encounter diagnosis)  Comment: 51 year old female  - Review Health Maintenance Protocol  Plan: continue with diet and exercise    (J45.20) Intermittent asthma without complication, unspecified asthma severity  Comment: will add new medication called Singular to help with clear phlegm in throat + Rx refills  Plan: fluticasone-salmeterol (ADVAIR) 250-50 MCG/DOSE        inhaler, montelukast (SINGULAIR) 10 MG tablet;        albuterol (PROAIR HFA/PROVENTIL HFA/VENTOLIN         HFA) 108 (90 Base) MCG/ACT inhaler    (Z23) Encounter for immunization  Comment:   Plan: TDAP VACCINE (Adacel, Boostrix)  [1040823]  - Review Health Maintenance Protocol      Patient has been advised of split billing requirements and indicates understanding: Yes  COUNSELING:  Reviewed preventive health counseling, as reflected in patient instructions       Regular exercise       Healthy diet/nutrition       Immunizations    Vaccinated for: TDAP          Estimated body mass index is 20.94 kg/m  as calculated from the following:    Height as of this encounter: 1.626 m (5' 4\").    Weight as of this encounter: 55.3 kg (122 lb).        She reports that she has never smoked. She has never used smokeless tobacco.      Counseling Resources:  ATP IV Guidelines  Pooled Cohorts " Equation Calculator  Breast Cancer Risk Calculator  BRCA-Related Cancer Risk Assessment: FHS-7 Tool  FRAX Risk Assessment  ICSI Preventive Guidelines  Dietary Guidelines for Americans, 2010  USDA's MyPlate  ASA Prophylaxis  Lung CA Screening    [facility]

## 2022-02-15 ENCOUNTER — TELEPHONE (OUTPATIENT)
Dept: TRANSPLANT | Facility: CLINIC | Age: 52
End: 2022-02-15
Payer: COMMERCIAL

## 2022-02-15 NOTE — TELEPHONE ENCOUNTER
"Donor Intake Start:22Donor Intake Complete:22  Expiration Date:22  Gender:FemalePreferred Language:English  Full Name:Jie Nielsen  Needed:[not answered]  Phone Number:[phone] Phone:  Contact Preference:[not answered]Best Contact Time:4pm - 5pm  Emergency Contact:Amirah Bourne Contact #:[phone]  Relationship to Contact:Contact is my child  :[birth year]Age:51  [place] [facility] [place]  [address]  State:[id number]  Height:5'5\"Weight:119lbs  BMI:19.8  Employment Status:EmployedHas PTO for donation?Yes, using vacation  Occupation:occupational therapistRequires Heavy Lifting?  No  Education Level:Advanced DegreeMarital Status:Single  Exercise Routine:2-3/WeekHealth Insurance:  Yes  Blood Type:AEthnicity/Race:White  Donor Type:Standard Voucher Donor  Prefer Remote Donation:[not answered]  Physician:Jammie Little  [place]  Donating for Local Recipient   Recipient's Name:Jared Lawson's :52  Recipient's Status:Patient not on dialysis but needs a transplant soon.How DD knows Recip:Friend  DD communicates w/ Recip:Less Than Once A Month  Indicated possible interest in:  Motivation to donate:  I have always told myself that if I was asked to donate I would. I have known Jared for a long time and would like to help him.  Living Donor Pre-Screening  [place] In [place]  Yes  Will Accept Blood Transfusions?  Yes  Has been Diagnosed with Kidney Disease?  No  Has had a Heart Attack?  No  Has Diabetes?  No  Has had Cancer?  No  Has had Kidney Stones?  No  Has ever been Pregnant?  Yes    - Is Currently Pregnant?  No    - Months Since Pregnancy?24+    - Is Currently Nursing?  No    - Gestational Diabetes?  Yes    - Hypertension during pregnancy?  Never  Is Planning on Pregnancy?  No  Is Taking Birth Control?  No  Has Used Tobacco  No  Has HIV?  No  Is Currently Incarcerated?  No  Is Currently Residing in " [place]  Yes  History Misc  Has Allergies?  Yes  Allergy  morphine, erythromycin, mold, grass, trees, ragweed  Has had Surgeries?  Yes  Surgery When  2 sections, several abdominal sx for infertility, breast implants   Takes Medication?  No  Medical History  History of High BP?  Never  Has History Of CABG (bypass surgery)?  No  History of Blood Clots?  Never  History of Coronary Disease?  Never  Has Stents Implanted?  No  Has History of Chest Pain with Exercise?  No  Has History of Chest Pain at Other Times?  No  Results of Climbing 2 Flights of Stairs?No Problem  Has had Stress Test within Last Year?  No  Has had Stroke?  No  Has had Leg Bypass?  No  History of Lung Disease?  Never  History of COPD?  Never  History of TB?  Never    - Is TB Active?[not answered]  History of Pneumonia?  Treated in past  Has Respiratory Issues?  Yes    - Respiratory Issues:asthma  Has Gastro Issues?  No  History of Gallstones?  Never  History of Pancreatitis?  Never  History of Liver Disease?  Never  History of Hepatitis B?  Never    - Is Hep B Active?[not answered]  History of Hepatitis C?  Never  History of Bleeding Problem?  Never  History of UTIs?  Yes    - UTI episodes:3    - Last UTI:5 years  History of Kidney Damage?  Never  History of Proteinuria?  Never  History of Hematuria?  Never  History of Neuro Disease?  Never  History of Seizure?  Never  History of Lupus?  Never  History of Paralysis?  Never  History of Arthritis?  Never  History of Neuropathy?  Never  History of Depression?  Never  History of Anxiety?  Never  History of Documented Psychiatric Illness?  Never  History of Fibroid Uterus?  Never  History of Endometriosis?  Treated in past  History of Polycystic Ovaries?  Treated in past  Has had Miscarriages?  Yes    - # of Miscarriages:3    - Had Late Term Miscarriage?  No  Has had Abortions?  Yes    - # of Abortions:1  Has had Transfusions?  No  History of Obesity?  No  History of Fabry's Disease?  No  History of Sickle  Cell Disease?  No  History of Sickle Cell Trait?  No  History of Sarcoidosis?  No  History of Auto-Immune Disease  No  Has had Physical Exam?  Yes    - how many years ago:1  Has had Mammogram?  Yes    - how many years ago:1  Has had Pap Smear?  Yes    - how many years ago:1  Has had Colonoscopy?  Yes    - How Many Years Ago:1  Medical History Comments?No  Living Donor Family Medical History  Anyone with kidney disease?  No  Anyone with liver disease?  No  Anyone with heart disease?  No  Anyone with coronary artery disease?  No  Anyone with high blood pressure?  No  Anyone with blood disorder?  No  Anyone with cancer?  Yes    - which family members:Mother, father, grandmother  Anyone with kidney cancer?  No  Anyone with diabetes?  No  Is mother alive?  Yes  Mother's age?78  Is father alive?  Yes  Father's age?83  How many siblings?1  How many adult children?2  How many children under 18?0  Social History  Has Used Alcohol?  Yes    - currently uses alcohol:  Yes    - how much:2/Weekly  Has Abused Alcohol?  No  Has Used Drugs?  No  Has had legal issues w/ law enforcement?  No  Traveled over 100 miles from home in last year?  No  Has had suicidal thoughts or attempts in the last five years?  No

## 2022-02-18 ENCOUNTER — TELEPHONE (OUTPATIENT)
Dept: TRANSPLANT | Facility: CLINIC | Age: 52
End: 2022-02-18
Payer: COMMERCIAL

## 2022-02-18 NOTE — TELEPHONE ENCOUNTER
Initial Independent Living Donor Advocate contact made with potential donor today.  I introduced myself and my role during the donation process, includin.  MENDEL ROLE   The federal government requires that all licensed transplant centers provide the living donor with an Independent Living Donor Advocate (MENDEL).  I do not meet recipients or attend meetings that discuss their care or decision to transplant them. My role is separate to avoid any conflict of interest.  My role is to ensure:  1) your rights are protected;  2) you get all the information you need from the transplant team to make a fully informed decision whether to donate;   3) that living donation is in your best interest.   4) that you have the right to decide NOT to go forward with living donation at any time during this process.  I am available to you throughout the workup, during surgery phase and follow-up at home.   2. WORKUP & PRIVACY     Your identity and workup are not shared with the recipient at any time.     There is a medical donor workup that consists of testing to determine if you are healthy enough to donate.  Workup tests include many blood draws, urine collection/ (kidney function testing), chest x-ray, EKG, CT scan. As you complete each step then you may move on to the next.  Workup can take as little or as long as you need and you can stop the process at any time.     Transplant is a treatment option, not a cure. A kidney from a living kidney donor can last 12-14 years.  Other treatment options are  donation and two types of dialysis.     This is major surgery and your estimated hospital stay is approximately 1-2 nights.  After surgery, there are driving and lifting restrictions - no driving for two weeks and no lifting over ten pounds for 6 - 8 weeks.  Donors are routinely off from work for 4 - 6 weeks after surgery, and potentially longer if they have a physical job.       If you anticipate lost wages due to donation,  donor wage reimbursement options may be available to you and will be reviewed with you during the evaluation process.      The recipient's insurance covers the medical expenses related to the donor evaluation and surgery.  However, it is important for you to carry your own health insurance to address any medical issues that are found and are NOT related to living donation.  3.  QUESTIONS  Have you received a packet from the transplant department?     Questions?    Have you discussed with anyone your potential decision to donate?   no  Is anyone pressuring or coercing you to donate? No.  Have you discussed any financial arrangements with recipient around donating a kidney? No.  Are you aware that you can confidentially opt out at any time, up to and including day of donation? Yes.  At this time, would you like to proceed with the medical evaluation to see if you can be a kidney donor?  No.    If yes, the donor coordinator will be reaching out to you with next steps.     You can reach me or someone else on the MENDEL team by calling [phone] Option 3.    MENDEL NOTES: She is currently overwhelmed with the demands of her doctoral program and caring for her daughters, one of whom is preparing to leave for college.  She has many concerns about being a donor and is emotionally unable to take donation.  She would like to opt out and I support her decision.  No coordinator call scheduled and we can close out her file.    Duration of call 30 minutes

## 2022-08-09 ENCOUNTER — HOSPITAL ENCOUNTER (OUTPATIENT)
Dept: MAMMOGRAPHY | Facility: CLINIC | Age: 52
Discharge: HOME | End: 2022-08-09
Attending: NURSE PRACTITIONER | Admitting: NURSE PRACTITIONER
Payer: COMMERCIAL

## 2022-08-09 DIAGNOSIS — Z12.31 VISIT FOR SCREENING MAMMOGRAM: ICD-10-CM

## 2022-08-09 PROCEDURE — 77067 SCR MAMMO BI INCL CAD: CPT

## 2022-10-07 DIAGNOSIS — J45.20 INTERMITTENT ASTHMA WITHOUT COMPLICATION, UNSPECIFIED ASTHMA SEVERITY: ICD-10-CM

## 2022-10-10 ENCOUNTER — MYC MEDICAL ADVICE (OUTPATIENT)
Dept: INTERNAL MEDICINE | Facility: CLINIC | Age: 52
End: 2022-10-10

## 2022-10-10 RX ORDER — FLUTICASONE PROPIONATE AND SALMETEROL 250; 50 UG/1; UG/1
POWDER RESPIRATORY (INHALATION)
Qty: 60 EACH | Refills: 0 | Status: SHIPPED | OUTPATIENT
Start: 2022-10-10 | End: 2022-11-09

## 2022-10-10 NOTE — TELEPHONE ENCOUNTER
Last office visit with Zoë Brambila 11//4/21. Patient advised f/u in 1 year.    Routing refill request to provider for review/approval because:  Failed protocol    Sent CONEXANCE MD message advising appointment.

## 2022-11-02 ASSESSMENT — ASTHMA QUESTIONNAIRES
QUESTION_1 LAST FOUR WEEKS HOW MUCH OF THE TIME DID YOUR ASTHMA KEEP YOU FROM GETTING AS MUCH DONE AT WORK, SCHOOL OR AT HOME: NONE OF THE TIME
QUESTION_2 LAST FOUR WEEKS HOW OFTEN HAVE YOU HAD SHORTNESS OF BREATH: NOT AT ALL
QUESTION_4 LAST FOUR WEEKS HOW OFTEN HAVE YOU USED YOUR RESCUE INHALER OR NEBULIZER MEDICATION (SUCH AS ALBUTEROL): NOT AT ALL
ACT_TOTALSCORE: 25
ACT_TOTALSCORE: 25
QUESTION_3 LAST FOUR WEEKS HOW OFTEN DID YOUR ASTHMA SYMPTOMS (WHEEZING, COUGHING, SHORTNESS OF BREATH, CHEST TIGHTNESS OR PAIN) WAKE YOU UP AT NIGHT OR EARLIER THAN USUAL IN THE MORNING: NOT AT ALL
QUESTION_5 LAST FOUR WEEKS HOW WOULD YOU RATE YOUR ASTHMA CONTROL: COMPLETELY CONTROLLED

## 2022-11-02 ASSESSMENT — ENCOUNTER SYMPTOMS
PARESTHESIAS: 0
EYE PAIN: 0
HEMATOCHEZIA: 0
DIARRHEA: 0
SHORTNESS OF BREATH: 0
BREAST MASS: 0
JOINT SWELLING: 0
DYSURIA: 0
WEAKNESS: 0
ARTHRALGIAS: 0
HEMATURIA: 0
HEADACHES: 0
DIZZINESS: 0
ABDOMINAL PAIN: 0
FEVER: 0
CHILLS: 0
CONSTIPATION: 0
COUGH: 0
SORE THROAT: 0
NAUSEA: 0
FREQUENCY: 0
PALPITATIONS: 0
NERVOUS/ANXIOUS: 0
HEARTBURN: 0
MYALGIAS: 0

## 2022-11-09 ENCOUNTER — OFFICE VISIT (OUTPATIENT)
Dept: INTERNAL MEDICINE | Facility: CLINIC | Age: 52
End: 2022-11-09
Payer: COMMERCIAL

## 2022-11-09 VITALS
DIASTOLIC BLOOD PRESSURE: 71 MMHG | RESPIRATION RATE: 16 BRPM | HEART RATE: 70 BPM | HEIGHT: 64 IN | WEIGHT: 121 LBS | BODY MASS INDEX: 20.66 KG/M2 | SYSTOLIC BLOOD PRESSURE: 104 MMHG | TEMPERATURE: 97.7 F | OXYGEN SATURATION: 100 %

## 2022-11-09 DIAGNOSIS — Z13.220 SCREENING FOR HYPERLIPIDEMIA: ICD-10-CM

## 2022-11-09 DIAGNOSIS — J45.30 MILD PERSISTENT ASTHMA WITHOUT COMPLICATION: ICD-10-CM

## 2022-11-09 DIAGNOSIS — Z00.00 ANNUAL PHYSICAL EXAM: Primary | ICD-10-CM

## 2022-11-09 DIAGNOSIS — Z13.29 SCREENING FOR THYROID DISORDER: ICD-10-CM

## 2022-11-09 PROCEDURE — 99396 PREV VISIT EST AGE 40-64: CPT | Performed by: INTERNAL MEDICINE

## 2022-11-09 RX ORDER — FLUTICASONE PROPIONATE AND SALMETEROL 250; 50 UG/1; UG/1
1 POWDER RESPIRATORY (INHALATION) EVERY 12 HOURS
Qty: 60 EACH | Refills: 6 | Status: SHIPPED | OUTPATIENT
Start: 2022-11-09 | End: 2023-08-30

## 2022-11-09 ASSESSMENT — ENCOUNTER SYMPTOMS
CHILLS: 0
COUGH: 0
PALPITATIONS: 0
ARTHRALGIAS: 0
NERVOUS/ANXIOUS: 0
MYALGIAS: 0
ABDOMINAL PAIN: 0
DIARRHEA: 0
NAUSEA: 0
CONSTIPATION: 0
DYSURIA: 0
FREQUENCY: 0
HEARTBURN: 0
HEADACHES: 0
SORE THROAT: 0
PARESTHESIAS: 0
DIZZINESS: 0
JOINT SWELLING: 0
FEVER: 0
SHORTNESS OF BREATH: 0
HEMATURIA: 0
HEMATOCHEZIA: 0
EYE PAIN: 0
BREAST MASS: 0
WEAKNESS: 0

## 2022-11-09 NOTE — PROGRESS NOTES
SUBJECTIVE:   CC: Jie is an 52 year old who presents for preventive health visit.         Patient is a 52-year-old  female who presents to the clinic for her annual physical.  She has no acute concerns or complaints today.  Patient does have a history of mild, persistent asthma.  She has been using Wixela 250/50 mcg/puff every 12 hours as a controller therapy.  Patient states that she has been on this medication for the past few years.  She does state that she had she has been using the inhaled corticosteroid that she does not require the use of her albuterol inhaler.  Patient is tolerating medication without issue.  She reports a stable appetite.  Patient is stooling and voiding without difficulty.  She sleeps well at night.  Patient is not fasting for lab work at this time.  Patient did undergo a total hysterectomy in 2013, and she does not have pelvic exams completed anymore.    Healthy Habits:     Getting at least 3 servings of Calcium per day:  Yes    Bi-annual eye exam:  Yes    Dental care twice a year:  Yes    Sleep apnea or symptoms of sleep apnea:  None    Diet:  Carbohydrate counting and Gluten-free/reduced    Frequency of exercise:  2-3 days/week    Duration of exercise:  30-45 minutes    Taking medications regularly:  Yes    Medication side effects:  Not applicable    PHQ-2 Total Score: 0    Additional concerns today:  Yes    Ability to successfully perform activities of daily living: Yes, no assistance needed  Home safety:  none identified   Hearing impairment: No            Today's PHQ-2 Score:   PHQ-2 ( 1999 [employer]) 11/2/2022   Q1: Little interest or pleasure in doing things 0   Q2: Feeling down, depressed or hopeless 0   PHQ-2 Score 0   PHQ-2 Total Score (12-17 Years)- Positive if 3 or more points; Administer PHQ-A if positive -   Q1: Little interest or pleasure in doing things Not at all   Q2: Feeling down, depressed or hopeless Not at all   PHQ-2 Score 0       Abuse: Current or Past  (Physical, Sexual or Emotional) - No  Do you feel safe in your environment? Yes        Social History     Tobacco Use     Smoking status: Never     Smokeless tobacco: Never   Substance Use Topics     Alcohol use: Yes     Alcohol/week: 2.0 standard drinks     Comment: occasional wine 1x weekly         Alcohol Use 11/2/2022   Prescreen: >3 drinks/day or >7 drinks/week? No   Prescreen: >3 drinks/day or >7 drinks/week? -       Reviewed orders with patient.  Reviewed health maintenance and updated orders accordingly - Yes  Lab work is in process    Breast Cancer Screening:    FHS-7:   Breast CA Risk Assessment (FHS-7) 8/6/2021 8/9/2022 11/2/2022   Did any of your first-degree relatives have breast or ovarian cancer? No Yes Yes   Did any of your relatives have bilateral breast cancer? No No Yes   Did any man in your family have breast cancer? No No No   Did any woman in your family have breast and ovarian cancer? Yes No Yes   Did any woman in your family have breast cancer before age 50 y? No No No   Do you have 2 or more relatives with breast and/or ovarian cancer? No No No   Do you have 2 or more relatives with breast and/or bowel cancer? No No No       Mammogram Screening: Recommended annual mammography  Pertinent mammograms are reviewed under the imaging tab.    History of abnormal Pap smear: Status post benign hysterectomy. Health Maintenance and Surgical History updated.  PAP / HPV 10/29/2012 10/17/2011 12/6/2010   PAP (Historical) NIL NIL NIL     Reviewed and updated as needed this visit by clinical staff    Allergies               Reviewed and updated as needed this visit by Provider                     Review of Systems   Constitutional: Negative for chills and fever.   HENT: Negative for congestion, ear pain, hearing loss and sore throat.    Eyes: Negative for pain and visual disturbance.   Respiratory: Negative for cough and shortness of breath.    Cardiovascular: Negative for chest pain, palpitations and  "peripheral edema.   Gastrointestinal: Negative for abdominal pain, constipation, diarrhea, heartburn, hematochezia and nausea.   Breasts:  Negative for tenderness, breast mass and discharge.   Genitourinary: Negative for dysuria, frequency, genital sores, hematuria, pelvic pain, urgency, vaginal bleeding and vaginal discharge.   Musculoskeletal: Negative for arthralgias, joint swelling and myalgias.   Skin: Negative for rash.   Neurological: Negative for dizziness, weakness, headaches and paresthesias.   Psychiatric/Behavioral: Negative for mood changes. The patient is not nervous/anxious.           OBJECTIVE:   LMP 11/22/2013    Blood pressure 104/71, pulse 70, temperature 97.7  F (36.5  C), resp. rate 16, height 1.626 m (5' 4\"), weight 54.9 kg (121 lb), last menstrual period 11/22/2013, SpO2 100 %, not currently breastfeeding.      Physical Exam  Vitals reviewed.   Constitutional:       General: She is not in acute distress.     Appearance: Normal appearance. She is well-developed. She is not ill-appearing.   HENT:      Head: Normocephalic and atraumatic.      Right Ear: Tympanic membrane and external ear normal.      Left Ear: Tympanic membrane and external ear normal.      Nose: Nose normal.      Mouth/Throat:      Mouth: Mucous membranes are moist.      Pharynx: No oropharyngeal exudate.   Eyes:      General:         Right eye: No discharge.         Left eye: No discharge.      Extraocular Movements: Extraocular movements intact.      Conjunctiva/sclera: Conjunctivae normal.   Neck:      Thyroid: No thyromegaly.      Trachea: No tracheal deviation.   Cardiovascular:      Rate and Rhythm: Normal rate and regular rhythm.      Pulses: Normal pulses.      Heart sounds: Normal heart sounds, S1 normal and S2 normal. No murmur heard.    No friction rub. No S3 or S4 sounds.   Pulmonary:      Effort: Pulmonary effort is normal. No respiratory distress.      Breath sounds: Normal breath sounds. No wheezing or rales. "   Abdominal:      General: Bowel sounds are normal.      Palpations: Abdomen is soft. There is no mass.      Tenderness: There is no abdominal tenderness.   Musculoskeletal:         General: Normal range of motion.      Cervical back: Neck supple.   Lymphadenopathy:      Cervical: No cervical adenopathy.   Skin:     General: Skin is warm and dry.      Findings: No rash.   Neurological:      Mental Status: She is alert and oriented to person, place, and time.      Motor: No abnormal muscle tone.      Deep Tendon Reflexes: Reflexes are normal and symmetric.   Psychiatric:         Behavior: Behavior normal.         Thought Content: Thought content normal.         Judgment: Judgment normal.       Diagnostic Test Results: Future lab orders for CMP, CBC, FLP, and TSH with reflex free T4 have been placed.    ASSESSMENT/PLAN:   (Z00.00) Annual physical exam  (primary encounter diagnosis)  Comment: At this time, patient does have an unremarkable physical examination.  Her blood pressure is noted to be at an acceptable level.  We did spend some time discussing appropriate dietary and lifestyle modifications necessary to help keep her weight and blood pressure under good control.  Patient is up-to-date in regards to her mammogram and colonoscopy.  She no longer has pelvic examinations completed due to having a prior total hysterectomy.  Patient will return at a later time for fasting lab collection.    (J45.30) Mild persistent asthma without complication  Comment: At this time, patient's asthma appears to be under good control.  We will continue her Wixela 250/50 mcg per actuation with instruction to 1 puff inhaled twice per day as a controller therapy.  Side effects of medication were reviewed.  Patient can continue her as needed use of albuterol for cough, wheeze, or shortness of breath.  Appropriate inhaler technique was reviewed.  Asthma action plan completed today.    (Z13.323) Screening for hyperlipidemia  Comment: Lipid  "panel reflex to direct LDL Fasting is pending.    (Z13.29) Screening for thyroid disorder  Comment: TSH with free T4 reflex is pending.      Patient has been advised of split billing requirements and indicates understanding: Yes      COUNSELING:  Reviewed preventive health counseling, as reflected in patient instructions    Estimated body mass index is 20.94 kg/m  as calculated from the following:    Height as of 11/4/21: 1.626 m (5' 4\").    Weight as of 11/4/21: 55.3 kg (122 lb).        She reports that she has never smoked. She has never used smokeless tobacco.      Counseling Resources:  ATP IV Guidelines  Pooled Cohorts Equation Calculator  Breast Cancer Risk Calculator  BRCA-Related Cancer Risk Assessment: FHS-7 Tool  FRAX Risk Assessment  ICSI Preventive Guidelines  Dietary Guidelines for Americans, 2010  USDA's MyPlate  ASA Prophylaxis  Lung CA Screening    Dario Gonzalez MD  [facility]  "

## 2022-11-09 NOTE — PATIENT INSTRUCTIONS
My Asthma Action Plan    Name: Jie Siddiqui   YOB: 1970  Date: 11/9/2022   My doctor: Dario Gonzalez MD   My clinic: [facility]        My Control Medicine: Fluticasone propionate + salmeterol (Advair Diskus or Wixela Inhub) -  250/50 mcg BID  My Rescue Medicine: Albuterol (Proair/Ventolin/Proventil HFA) 2-4 puffs EVERY 4 HOURS as needed. Use a spacer if recommended by your provider.   My Asthma Severity:   Mild Persistent  Know your asthma triggers: smoke and upper respiratory infections               GREEN ZONE   Good Control  I feel good  No cough or wheeze  Can work, sleep and play without asthma symptoms       Take your asthma control medicine every day.     If exercise triggers your asthma, take your rescue medication  15 minutes before exercise or sports, and  During exercise if you have asthma symptoms  Spacer to use with inhaler: If you have a spacer, make sure to use it with your inhaler             YELLOW ZONE Getting Worse  I have ANY of these:  I do not feel good  Cough or wheeze  Chest feels tight  Wake up at night   Keep taking your Green Zone medications  Start taking your rescue medicine:  every 20 minutes for up to 1 hour. Then every 4 hours for 24-48 hours.  If you stay in the Yellow Zone for more than 12-24 hours, contact your doctor.  If you do not return to the Green Zone in 12-24 hours or you get worse, start taking your oral steroid medicine if prescribed by your provider.           RED ZONE Medical Alert - Get Help  I have ANY of these:  I feel awful  Medicine is not helping  Breathing getting harder  Trouble walking or talking  Nose opens wide to breathe       Take your rescue medicine NOW  If your provider has prescribed an oral steroid medicine, start taking it NOW  Call your doctor NOW  If you are still in the Red Zone after 20 minutes and you have not reached your doctor:  Take your rescue medicine again and  Call 911 or go to the  emergency room right away    See your regular doctor within 2 weeks of an Emergency Room or Urgent Care visit for follow-up treatment.          Annual Reminders:  Meet with Asthma Educator,  Flu Shot in the Fall, consider Pneumonia Vaccination for patients with asthma (aged 19 and older).    Pharmacy: [id number] [place], [place] - [address] OF [facility] [id number]    Electronically signed by Dario Gonzalez MD   Date: 11/09/22                      Asthma Triggers  How To Control Things That Make Your Asthma Worse    Triggers are things that make your asthma worse.  Look at the list below to help you find your triggers and what you can do about them.  You can help prevent asthma flare-ups by staying away from your triggers.      Trigger                                                          What you can do   Cigarette Smoke  Tobacco smoke can make asthma worse. Do not allow smoking in your home, car or around you.  Be sure no one smokes at a child s day care or school.  If you smoke, ask your health care provider for ways to help you quit.  Ask family members to quit too.  Ask your health care provider for a referral to Quit Plan to help you quit smoking, or call 7-500-934-PLAN.     Colds, Flu, Bronchitis  These are common triggers of asthma. Wash your hands often.  Don t touch your eyes, nose or mouth.  Get a flu shot every year.     Dust Mites  These are tiny bugs that live in cloth or carpet. They are too small to see. Wash sheets and blankets in hot water every week.   Encase pillows and mattress in dust mite proof covers.  Avoid having carpet if you can. If you have carpet, vacuum weekly.   Use a dust mask and HEPA vacuum.   Pollen and Outdoor Mold  Some people are allergic to trees, grass, or weed pollen, or molds. Try to keep your windows closed.  Limit time out doors when pollen count is high.   Ask you health care provider about taking medicine during allergy season.      Animal Dander  Some people are allergic to skin flakes, urine or saliva from pets with fur or feathers. Keep pets with fur or feathers out of your home.    If you can t keep the pet outdoors, then keep the pet out of your bedroom.  Keep the bedroom door closed.  Keep pets off cloth furniture and away from stuffed toys.     Mice, Rats, and Cockroaches   Some people are allergic to the waste from these pests.   Cover food and garbage.  Clean up spills and food crumbs.  Store grease in the refrigerator.   Keep food out of the bedroom.   Indoor Mold  This can be a trigger if your home has high moisture. Fix leaking faucets, pipes, or other sources of water.   Clean moldy surfaces.  Dehumidify basement if it is damp and smelly.   Smoke, Strong Odors, and Sprays  These can reduce air quality. Stay away from strong odors and sprays, such as perfume, powder, hair spray, paints, smoke incense, paint, cleaning products, candles and new carpet.   Exercise or Sports  Some people with asthma have this trigger. Be active!  Ask your doctor about taking medicine before sports or exercise to prevent symptoms.    Warm up for 5-10 minutes before and after sports or exercise.     Other Triggers of Asthma  Cold air:  Cover your nose and mouth with a scarf.  Sometimes laughing or crying can be a trigger.  Some medicines and food can trigger asthma.     Preventive Health Recommendations  Female Ages 50 - 64    Yearly exam: See your health care provider every year in order to  Review health changes.   Discuss preventive care.    Review your medicines if your doctor has prescribed any.    Get a Pap test every three years (unless you have an abnormal result and your provider advises testing more often).  If you get Pap tests with HPV test, you only need to test every 5 years, unless you have an abnormal result.   You do not need a Pap test if your uterus was removed (hysterectomy) and you have not had cancer.  You should be tested each  year for STDs (sexually transmitted diseases) if you're at risk.   Have a mammogram every 1 to 2 years.  Have a colonoscopy at age 50, or have a yearly FIT test (stool test). These exams screen for colon cancer.    Have a cholesterol test every 5 years, or more often if advised.  Have a diabetes test (fasting glucose) every three years. If you are at risk for diabetes, you should have this test more often.   If you are at risk for osteoporosis (brittle bone disease), think about having a bone density scan (DEXA).    Shots: Get a flu shot each year. Get a tetanus shot every 10 years.    Nutrition:   Eat at least 5 servings of fruits and vegetables each day.  Eat whole-grain bread, whole-wheat pasta and brown rice instead of white grains and rice.  Get adequate Calcium and Vitamin D.     Lifestyle  Exercise at least 150 minutes a week (30 minutes a day, 5 days a week). This will help you control your weight and prevent disease.  Limit alcohol to one drink per day.  No smoking.   Wear sunscreen to prevent skin cancer.   See your dentist every six months for an exam and cleaning.  See your eye doctor every 1 to 2 years.

## 2022-11-21 ENCOUNTER — TELEPHONE (OUTPATIENT)
Dept: SCHEDULING | Facility: CLINIC | Age: 52
End: 2022-11-21

## 2022-11-21 NOTE — TELEPHONE ENCOUNTER
Reason for Call:  Appointment Request    Patient requesting this type of appt:  Ear pain    Requested provider: No Ref-Primary, Physician    Reason patient unable to be scheduled: Not within requested timeframe    When does patient want to be seen/preferred time: Same day    Comments: ear pain    Could we [facility] this information to you [facility] or would you prefer to receive a phone call?:   Patient would prefer a phone call   Okay to leave a detailed message?: Yes at Cell number on file:    Telephone Information:   Mobile [phone]       Call taken on 11/21/2022 at 11:54 AM by Eugenia Guzman

## 2022-11-23 ENCOUNTER — LAB (OUTPATIENT)
Dept: LAB | Facility: CLINIC | Age: 52
End: 2022-11-23
Payer: COMMERCIAL

## 2022-11-23 DIAGNOSIS — Z00.00 ANNUAL PHYSICAL EXAM: ICD-10-CM

## 2022-11-23 DIAGNOSIS — Z13.29 SCREENING FOR THYROID DISORDER: ICD-10-CM

## 2022-11-23 DIAGNOSIS — Z13.220 SCREENING FOR HYPERLIPIDEMIA: ICD-10-CM

## 2022-11-23 LAB
ALBUMIN SERPL BCG-MCNC: 4.3 G/DL (ref 3.5–5.2)
ALP SERPL-CCNC: 54 U/L (ref 35–104)
ALT SERPL W P-5'-P-CCNC: 7 U/L (ref 10–35)
ANION GAP SERPL CALCULATED.3IONS-SCNC: 13 MMOL/L (ref 7–15)
AST SERPL W P-5'-P-CCNC: 17 U/L (ref 10–35)
BASOPHILS # BLD AUTO: 0 10E3/UL (ref 0–0.2)
BASOPHILS NFR BLD AUTO: 1 %
BILIRUB SERPL-MCNC: 0.4 MG/DL
BUN SERPL-MCNC: 20.8 MG/DL (ref 6–20)
CALCIUM SERPL-MCNC: 9.1 MG/DL (ref 8.6–10)
CHLORIDE SERPL-SCNC: 106 MMOL/L (ref 98–107)
CHOLEST SERPL-MCNC: 202 MG/DL
CREAT SERPL-MCNC: 0.94 MG/DL (ref 0.51–0.95)
DEPRECATED HCO3 PLAS-SCNC: 21 MMOL/L (ref 22–29)
EOSINOPHIL # BLD AUTO: 0.2 10E3/UL (ref 0–0.7)
EOSINOPHIL NFR BLD AUTO: 2 %
ERYTHROCYTE [DISTWIDTH] IN BLOOD BY AUTOMATED COUNT: 12.9 % (ref 10–15)
GFR SERPL CREATININE-BSD FRML MDRD: 73 ML/MIN/1.73M2
GLUCOSE SERPL-MCNC: 103 MG/DL (ref 70–99)
HCT VFR BLD AUTO: 40.8 % (ref 35–47)
HDLC SERPL-MCNC: 78 MG/DL
HGB BLD-MCNC: 14.1 G/DL (ref 11.7–15.7)
LDLC SERPL CALC-MCNC: 111 MG/DL
LYMPHOCYTES # BLD AUTO: 2.2 10E3/UL (ref 0.8–5.3)
LYMPHOCYTES NFR BLD AUTO: 26 %
MCH RBC QN AUTO: 32.6 PG (ref 26.5–33)
MCHC RBC AUTO-ENTMCNC: 34.6 G/DL (ref 31.5–36.5)
MCV RBC AUTO: 94 FL (ref 78–100)
MONOCYTES # BLD AUTO: 0.5 10E3/UL (ref 0–1.3)
MONOCYTES NFR BLD AUTO: 7 %
NEUTROPHILS # BLD AUTO: 5.3 10E3/UL (ref 1.6–8.3)
NEUTROPHILS NFR BLD AUTO: 64 %
NONHDLC SERPL-MCNC: 124 MG/DL
PLATELET # BLD AUTO: 210 10E3/UL (ref 150–450)
POTASSIUM SERPL-SCNC: 4.2 MMOL/L (ref 3.4–5.3)
PROT SERPL-MCNC: 7 G/DL (ref 6.4–8.3)
RBC # BLD AUTO: 4.33 10E6/UL (ref 3.8–5.2)
SODIUM SERPL-SCNC: 140 MMOL/L (ref 136–145)
TRIGL SERPL-MCNC: 64 MG/DL
TSH SERPL DL<=0.005 MIU/L-ACNC: 1.07 UIU/ML (ref 0.3–4.2)
WBC # BLD AUTO: 8.2 10E3/UL (ref 4–11)

## 2022-11-23 PROCEDURE — 80061 LIPID PANEL: CPT

## 2022-11-23 PROCEDURE — 80050 GENERAL HEALTH PANEL: CPT

## 2022-11-23 PROCEDURE — 36415 COLL VENOUS BLD VENIPUNCTURE: CPT

## 2022-12-02 ENCOUNTER — VIRTUAL VISIT (OUTPATIENT)
Dept: INTERNAL MEDICINE | Facility: CLINIC | Age: 52
End: 2022-12-02
Payer: COMMERCIAL

## 2022-12-02 DIAGNOSIS — R09.82 POSTNASAL DRIP: ICD-10-CM

## 2022-12-02 DIAGNOSIS — H93.8X3 EAR FULLNESS, BILATERAL: Primary | ICD-10-CM

## 2022-12-02 PROCEDURE — 99213 OFFICE O/P EST LOW 20 MIN: CPT | Mod: 95 | Performed by: INTERNAL MEDICINE

## 2022-12-02 RX ORDER — FLUTICASONE PROPIONATE 50 MCG
1 SPRAY, SUSPENSION (ML) NASAL 2 TIMES DAILY PRN
Qty: 16 G | Refills: 0 | Status: SHIPPED | OUTPATIENT
Start: 2022-12-02

## 2022-12-02 RX ORDER — MONTELUKAST SODIUM 10 MG/1
10 TABLET ORAL AT BEDTIME
Qty: 90 TABLET | Refills: 3 | Status: SHIPPED | OUTPATIENT
Start: 2022-12-02

## 2022-12-02 RX ORDER — PREDNISONE 20 MG/1
40 TABLET ORAL DAILY
Qty: 10 TABLET | Refills: 0 | Status: SHIPPED | OUTPATIENT
Start: 2022-12-02 | End: 2022-12-07

## 2022-12-02 NOTE — PROGRESS NOTES
VIDEO VISIT                                                       ASSESSMENT/PLAN                                                      (H93.8X3) Ear fullness, bilateral  (primary encounter diagnosis)  (R09.82) Postnasal drip  Plan: TRIAL of Flonase and Singulair daily and consistently for suboptimally controlled allergies and postnasal drip; prednisone burst for possible eustachian tube dysfunction; course of Augmentin prescribed in case no improvement with aforementioned treatments; if symptoms worsen, change, or do not improve, patient to contact MD.       Total time of video call between patient and provider was 6 minutes (10:43-10:49am). Provider location: office. Patient location: home. Platform: [employer].     Demetria Miller MD   [facility] [employer]  [address] [place] [address]  T: [phone], F: [phone]    SUBJECTIVE                                                      Jie Siddiqui is a very pleasant 52 year old female who requested a video visit to discuss ongoing symptoms:    Patient was made aware that this visit will be billed the same as an in-person visit and has given verbal consent to proceed with this video visit.     Symptoms have been ongoing for the last several weeks to month. Symptoms include ear fullness that feels like fluid in her ear, headaches/head pain, and chronic postnasal drip. On Claritin daily and consistently for allergies. On Wixela and albuterol for asthma.    No focal sinus pressure or pain.  No fevers or chills.    OBJECTIVE                                                       Vitals: No vitals were obtained today due to virtual visit.    General: appears healthy, alert and no distress  Psychiatric: mentation normal, affect normal/bright, judgement and insight intact, normal speech and appearance well-groomed    ---    (Note was completed, in part, with [employer] voice-recognition software. Documentation reviewed, but some grammatical, spelling, and  word errors may remain.)

## 2023-03-13 ENCOUNTER — OFFICE VISIT (OUTPATIENT)
Dept: INTERNAL MEDICINE | Facility: CLINIC | Age: 53
End: 2023-03-13
Payer: COMMERCIAL

## 2023-03-13 VITALS
TEMPERATURE: 97.6 F | HEART RATE: 83 BPM | SYSTOLIC BLOOD PRESSURE: 113 MMHG | BODY MASS INDEX: 19.97 KG/M2 | WEIGHT: 117 LBS | RESPIRATION RATE: 18 BRPM | HEIGHT: 64 IN | OXYGEN SATURATION: 98 % | DIASTOLIC BLOOD PRESSURE: 68 MMHG

## 2023-03-13 DIAGNOSIS — Z01.818 PREOPERATIVE EXAMINATION: Primary | ICD-10-CM

## 2023-03-13 DIAGNOSIS — M19.071 OSTEOARTHRITIS OF TOE JOINT, RIGHT: ICD-10-CM

## 2023-03-13 LAB
ANION GAP SERPL CALCULATED.3IONS-SCNC: 12 MMOL/L (ref 7–15)
BASOPHILS # BLD AUTO: 0.1 10E3/UL (ref 0–0.2)
BASOPHILS NFR BLD AUTO: 1 %
BUN SERPL-MCNC: 21 MG/DL (ref 6–20)
CALCIUM SERPL-MCNC: 9.6 MG/DL (ref 8.6–10)
CHLORIDE SERPL-SCNC: 106 MMOL/L (ref 98–107)
CREAT SERPL-MCNC: 0.89 MG/DL (ref 0.51–0.95)
DEPRECATED HCO3 PLAS-SCNC: 22 MMOL/L (ref 22–29)
EOSINOPHIL # BLD AUTO: 0.1 10E3/UL (ref 0–0.7)
EOSINOPHIL NFR BLD AUTO: 2 %
ERYTHROCYTE [DISTWIDTH] IN BLOOD BY AUTOMATED COUNT: 12.7 % (ref 10–15)
GFR SERPL CREATININE-BSD FRML MDRD: 77 ML/MIN/1.73M2
GLUCOSE SERPL-MCNC: 106 MG/DL (ref 70–99)
HCT VFR BLD AUTO: 37.9 % (ref 35–47)
HGB BLD-MCNC: 13.1 G/DL (ref 11.7–15.7)
IMM GRANULOCYTES # BLD: 0 10E3/UL
IMM GRANULOCYTES NFR BLD: 0 %
LYMPHOCYTES # BLD AUTO: 2.2 10E3/UL (ref 0.8–5.3)
LYMPHOCYTES NFR BLD AUTO: 36 %
MCH RBC QN AUTO: 32.3 PG (ref 26.5–33)
MCHC RBC AUTO-ENTMCNC: 34.6 G/DL (ref 31.5–36.5)
MCV RBC AUTO: 94 FL (ref 78–100)
MONOCYTES # BLD AUTO: 0.5 10E3/UL (ref 0–1.3)
MONOCYTES NFR BLD AUTO: 8 %
NEUTROPHILS # BLD AUTO: 3.2 10E3/UL (ref 1.6–8.3)
NEUTROPHILS NFR BLD AUTO: 53 %
PLATELET # BLD AUTO: 219 10E3/UL (ref 150–450)
POTASSIUM SERPL-SCNC: 3.9 MMOL/L (ref 3.4–5.3)
RBC # BLD AUTO: 4.05 10E6/UL (ref 3.8–5.2)
SODIUM SERPL-SCNC: 140 MMOL/L (ref 136–145)
WBC # BLD AUTO: 6 10E3/UL (ref 4–11)

## 2023-03-13 PROCEDURE — 85025 COMPLETE CBC W/AUTO DIFF WBC: CPT | Performed by: INTERNAL MEDICINE

## 2023-03-13 PROCEDURE — 36415 COLL VENOUS BLD VENIPUNCTURE: CPT | Performed by: INTERNAL MEDICINE

## 2023-03-13 PROCEDURE — 80048 BASIC METABOLIC PNL TOTAL CA: CPT | Performed by: INTERNAL MEDICINE

## 2023-03-13 PROCEDURE — 99214 OFFICE O/P EST MOD 30 MIN: CPT | Performed by: INTERNAL MEDICINE

## 2023-03-13 ASSESSMENT — ENCOUNTER SYMPTOMS
GASTROINTESTINAL NEGATIVE: 1
RESPIRATORY NEGATIVE: 1
CONSTITUTIONAL NEGATIVE: 1
ARTHRALGIAS: 1
NEUROLOGICAL NEGATIVE: 1
CARDIOVASCULAR NEGATIVE: 1

## 2023-03-13 NOTE — PROGRESS NOTES
Addendum   No concerning findings were noted on CBC or BMP.  Patient should be able to proceed with her surgery as scheduled.    [facility]  [address] [facility]  [address]  [facility] [address]  Phone: [phone]  Primary Provider: Dario Montoya  Pre-op Performing Provider: DARIO MONTOYA      PREOPERATIVE EVALUATION:  Today's date: 3/13/2023    Jie Siddiqui is a 53 year old female who presents for a preoperative evaluation.    Surgical Information:  Surgery/Procedure: right foot metatarsal fusion  Surgery Location: [facility]  Surgeon: Dr. Gusman  Surgery Date: 3/20/23  Time of Surgery: TBD  Where patient plans to recover: At home with family  Fax number for surgical facility: [phone]    Type of Anesthesia Anticipated: General    Assessment & Plan     The proposed surgical procedure is considered INTERMEDIATE risk.    Preoperative examination and osteoarthritis of the toe joint, right  At this time, patient does have an unremarkable physical emanation.  Her blood pressure is noted to be at an acceptable level.  She has previously tolerated anesthesia without issue.  She is also able to tolerate greater than 4 METS physical activity.  Her asthma does appear to be under excellent control.  Patient has a CBC and BMP that is currently pending.  I would consider her to be acceptable candidate to proceed with a noncardiac related surgery.  Assuming no abnormalities on her outstanding lab work, patient should be able to proceed with her surgery as currently scheduled.  She should follow any additional instructions as provided to her by her performing surgeon.  - Basic metabolic panel  (Ca, Cl, CO2, Creat, Gluc, K, Na, BUN); Future  - CBC with platelets and differential; Future         Risks and Recommendations:  The patient has the following additional risks and recommendations for perioperative complications:   - No identified additional risk factors  other than previously addressed    Medication Instructions:  Patient is to take all scheduled medications on the day of surgery    RECOMMENDATION:  APPROVAL GIVEN to proceed with proposed procedure pending review of diagnostic evaluation.    Ordering of each unique test  30 minutes spent on the date of the encounter doing chart review, history and exam, documentation and further activities per the note      Subjective     HPI related to upcoming procedure: Patient is a 53-year-old  female who presents to the clinic for preoperative examination.  She is currently scheduled to undergo right great toe metatarsal fusion on March 20, 2023.  Patient does have a history of osteoarthritis and bone spurs in this joint, and she has experienced significant pain.  Patient has previously received anesthesia for numerous surgeries in the past including hysterectomy, laparoscopy, and breast augmentation.  She has tolerated anesthesia without issue.  Patient is not aware of any family history of anesthesia intolerance or bleeding disorders.  She has no history of cardiac murmur.  Patient does workout with a  multiple times per week.  He does have a history of asthma, and she is on Wixela as a controller therapy.  Patient states that she cannot recall the last time she had to use her albuterol for any shortness of breath, wheezing, or cough.    Preop Questions 3/6/2023   1. Have you ever had a heart attack or stroke? No   2. Have you ever had surgery on your heart or blood vessels, such as a stent placement, a coronary artery bypass, or surgery on an artery in your head, neck, heart, or legs? No   3. Do you have chest pain with activity? No   4. Do you have a history of  heart failure? No   5. Do you currently have a cold, bronchitis or symptoms of other infection? No   6. Do you have a cough, shortness of breath, or wheezing? No   7. Do you or anyone in your family have previous history of blood clots? No   8. Do  you or does anyone in your family have a serious bleeding problem such as prolonged bleeding following surgeries or cuts? No   9. Have you ever had problems with anemia or been told to take iron pills? No   10. Have you had any abnormal blood loss such as black, tarry or bloody stools, or abnormal vaginal bleeding? No   11. Have you ever had a blood transfusion? No   12. Are you willing to have a blood transfusion if it is medically needed before, during, or after your surgery? Yes   13. Have you or any of your relatives ever had problems with anesthesia? No   14. Do you have sleep apnea, excessive snoring or daytime drowsiness? No   15. Do you have any artifical heart valves or other implanted medical devices like a pacemaker, defibrillator, or continuous glucose monitor? No   16. Do you have artificial joints? No   17. Are you allergic to latex? No   18. Is there any chance that you may be pregnant? No       Health Care Directive:  Patient does not have a Health Care Directive or Living Will: Discussed advance care planning with patient; however, patient declined at this time.    Preoperative Review of :   reviewed - no record of controlled substances prescribed.          Review of Systems   Constitutional: Negative.    HENT: Negative.    Respiratory: Negative.    Cardiovascular: Negative.    Gastrointestinal: Negative.    Genitourinary: Negative.    Musculoskeletal: Positive for arthralgias.   Neurological: Negative.          Patient Active Problem List    Diagnosis Date Noted     Anxiety 11/13/2018     Priority: Medium     Endometriosis      Priority: Medium     Polycystic ovaries      Priority: Medium     Diagnosis updated by automated process. Provider to review and confirm.       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     Past Surgical History:   Procedure Laterality Date     COLONOSCOPY N/A 11/27/2020    Procedure: COLONOSCOPY (fv);  Surgeon: Axel Mariano MD;  Location: [facility]     ECTOPIC PREGNANCY SURGERY  2003    cyst noted in abdomen not tubes     GYN SURGERY Bilateral 11/26/2013    LAPAROSCOPIC HYSTERECTOMY BILATERAL SALPINGECTOMY and cystoscopy;  Surgeon: Vijay Ruiz MD;  Location: [facility] OR - Path: Endometriosis, Adenomyosis, all benign     LAPAROSCOPIC HYSTERECTOMY TOTAL, BILATERAL SALPINGO-OOPHORECTOMY, COMBINED  11/26/2013    Procedure: COMBINED LAPAROSCOPIC HYSTERECTOMY TOTAL, SALPINGO-OOPHORECTOMY;  LAPAROSCOPIC HYSTERECTOMY BILATERAL SALPINGECTOMY and cystoscopy;  Surgeon: Vijay Ruiz MD;  Location: [facility] OR - Path: Endometriosis, Adenomyosis, all benign     [facility] NONSPECIFIC PROCEDURE  2001, 2004    C/S x2   4/21/01, 1/08/2004     [facility] NONSPECIFIC PROCEDURE  2000    Laparoscopy (endometriosis)  Abstracted 07/16/02     [facility] NONSPECIFIC PROCEDURE  2005    breast augmentation     Current Outpatient Medications   Medication Sig Dispense Refill     albuterol (PROAIR HFA/PROVENTIL HFA/VENTOLIN HFA) 108 (90 Base) MCG/ACT inhaler INHALE 2 PUFFS INTO THE LUNGS EVERY 4 HOURS AS NEEDED FOR SHORTNESS OF BREATH 8.5 g 5     amoxicillin-clavulanate (AUGMENTIN) 875-125 MG tablet Take 1 tablet by mouth 2 times daily 14 tablet 0     fluticasone (FLONASE) 50 MCG/ACT nasal spray Spray 1 spray into both nostrils 2 times daily as needed for rhinitis or allergies 16 g 0     fluticasone-salmeterol (WIXELA INHUB) 250-50 MCG/ACT inhaler Inhale 1 puff into the lungs every 12 hours 60 each 6     ibuprofen (ADVIL,MOTRIN) 200 MG tablet Take 200 mg by mouth every 4 hours as needed for pain 100 tablet 3      "loratadine (CLARITIN) 10 MG tablet Take 10 mg by mouth daily       montelukast (SINGULAIR) 10 MG tablet Take 1 tablet (10 mg) by mouth At Bedtime 90 tablet 3     Probiotic Product (PROBIOTIC DAILY PO)        VITAMIN D, CHOLECALCIFEROL, PO Take 5,000 Units by mouth daily         Allergies   Allergen Reactions     Erythromycin Hives     Morphine Hives               Social History     Tobacco Use     Smoking status: Never     Smokeless tobacco: Never   Substance Use Topics     Alcohol use: Yes     Alcohol/week: 2.0 standard drinks     Comment: 2-3 x/wk. only 2 glasses of wine       History   Drug Use No         Objective     LMP 11/22/2013 (Exact Date)   Breastfeeding No    Blood pressure 113/68, pulse 83, temperature 97.6  F (36.4  C), resp. rate 18, height 1.626 m (5' 4\"), weight 53.1 kg (117 lb), last menstrual period 11/22/2013, SpO2 98 %, not currently breastfeeding.        Physical Exam  Vitals reviewed.   Constitutional:       Appearance: Normal appearance.   HENT:      Head: Normocephalic and atraumatic.      Mouth/Throat:      Mouth: Mucous membranes are moist.      Pharynx: Oropharynx is clear.   Eyes:      Extraocular Movements: Extraocular movements intact.      Conjunctiva/sclera: Conjunctivae normal.      Pupils: Pupils are equal, round, and reactive to light.   Cardiovascular:      Rate and Rhythm: Normal rate and regular rhythm.      Pulses: Normal pulses.      Heart sounds: Normal heart sounds.   Pulmonary:      Effort: Pulmonary effort is normal.      Breath sounds: Normal breath sounds.   Abdominal:      General: Bowel sounds are normal.      Palpations: Abdomen is soft.   Skin:     General: Skin is warm and dry.      Capillary Refill: Capillary refill takes less than 2 seconds.   Neurological:      General: No focal deficit present.      Mental Status: She is alert and oriented to person, place, and time.           Recent Labs   Lab Test 11/23/22  1043   HGB 14.1         POTASSIUM 4.2 "   CR 0.94        Diagnostics:  Labs pending at this time.  Results will be reviewed when available.   No EKG required, no history of coronary heart disease, significant arrhythmia, peripheral arterial disease or other structural heart disease.    Revised Cardiac Risk Index (RCRI):  The patient has the following serious cardiovascular risks for perioperative complications:   - No serious cardiac risks = 0 points     RCRI Interpretation: 0 points: Class I (very low risk - 0.4% complication rate)           Signed Electronically by: Dario Gonzalez MD  Copy of this evaluation report is provided to requesting physician.

## 2023-03-15 ENCOUNTER — MYC MEDICAL ADVICE (OUTPATIENT)
Dept: INTERNAL MEDICINE | Facility: CLINIC | Age: 53
End: 2023-03-15
Payer: COMMERCIAL

## 2023-03-15 NOTE — TELEPHONE ENCOUNTER
My chart message sent by patient.    My chart message sent to patient.       Faxed pre op notes to surgery center

## 2023-06-06 ENCOUNTER — LAB (OUTPATIENT)
Dept: LAB | Facility: CLINIC | Age: 53
End: 2023-06-06
Attending: INTERNAL MEDICINE
Payer: COMMERCIAL

## 2023-06-06 ENCOUNTER — VIRTUAL VISIT (OUTPATIENT)
Dept: INTERNAL MEDICINE | Facility: CLINIC | Age: 53
End: 2023-06-06
Payer: COMMERCIAL

## 2023-06-06 DIAGNOSIS — R30.0 DYSURIA: Primary | ICD-10-CM

## 2023-06-06 DIAGNOSIS — N30.00 ACUTE CYSTITIS WITHOUT HEMATURIA: ICD-10-CM

## 2023-06-06 DIAGNOSIS — R30.0 DYSURIA: ICD-10-CM

## 2023-06-06 LAB
ALBUMIN UR-MCNC: NEGATIVE MG/DL
APPEARANCE UR: CLEAR
BACTERIA #/AREA URNS HPF: ABNORMAL /HPF
BILIRUB UR QL STRIP: NEGATIVE
COLOR UR AUTO: ABNORMAL
GLUCOSE UR STRIP-MCNC: NEGATIVE MG/DL
HGB UR QL STRIP: NEGATIVE
KETONES UR STRIP-MCNC: NEGATIVE MG/DL
LEUKOCYTE ESTERASE UR QL STRIP: ABNORMAL
MUCOUS THREADS #/AREA URNS LPF: PRESENT /LPF
NITRATE UR QL: NEGATIVE
PH UR STRIP: 7.5 [PH] (ref 5–7)
RBC URINE: 1 /HPF
SP GR UR STRIP: 1.01 (ref 1–1.03)
SQUAMOUS EPITHELIAL: <1 /HPF
UROBILINOGEN UR STRIP-MCNC: NORMAL MG/DL
WBC URINE: 12 /HPF

## 2023-06-06 PROCEDURE — 81001 URINALYSIS AUTO W/SCOPE: CPT

## 2023-06-06 PROCEDURE — 99213 OFFICE O/P EST LOW 20 MIN: CPT | Mod: GT | Performed by: INTERNAL MEDICINE

## 2023-06-06 PROCEDURE — 87086 URINE CULTURE/COLONY COUNT: CPT

## 2023-06-06 RX ORDER — SULFAMETHOXAZOLE/TRIMETHOPRIM 800-160 MG
1 TABLET ORAL 2 TIMES DAILY
Qty: 6 TABLET | Refills: 0 | Status: SHIPPED | OUTPATIENT
Start: 2023-06-06 | End: 2023-06-09

## 2023-06-06 ASSESSMENT — ASTHMA QUESTIONNAIRES
ACT_TOTALSCORE: 25
QUESTION_3 LAST FOUR WEEKS HOW OFTEN DID YOUR ASTHMA SYMPTOMS (WHEEZING, COUGHING, SHORTNESS OF BREATH, CHEST TIGHTNESS OR PAIN) WAKE YOU UP AT NIGHT OR EARLIER THAN USUAL IN THE MORNING: NOT AT ALL
ACT_TOTALSCORE: 25
QUESTION_2 LAST FOUR WEEKS HOW OFTEN HAVE YOU HAD SHORTNESS OF BREATH: NOT AT ALL
QUESTION_1 LAST FOUR WEEKS HOW MUCH OF THE TIME DID YOUR ASTHMA KEEP YOU FROM GETTING AS MUCH DONE AT WORK, SCHOOL OR AT HOME: NONE OF THE TIME
QUESTION_5 LAST FOUR WEEKS HOW WOULD YOU RATE YOUR ASTHMA CONTROL: COMPLETELY CONTROLLED
QUESTION_4 LAST FOUR WEEKS HOW OFTEN HAVE YOU USED YOUR RESCUE INHALER OR NEBULIZER MEDICATION (SUCH AS ALBUTEROL): NOT AT ALL

## 2023-06-06 NOTE — PROGRESS NOTES
Jie is a 53 year old who is being evaluated via a billable video visit.      How would you like to obtain your AVS? MyChart  If the video visit is dropped, the invitation should be resent by: Send to e-mail at: [email or web address]  Will anyone else be joining your video visit? No          Assessment & Plan     Acute cystitis without hematuria  - sulfamethoxazole-trimethoprim (BACTRIM DS) 800-160 MG tablet; Take 1 tablet by mouth 2 times daily for 3 days    Review of the result(s) of each unique test - ua  Ordering of each unique test  Prescription drug management         See Patient Instructions    Kwesi Silva MD  [facility]   Jie is a 53 year old, presenting for the following health issues:  UTI    History of Present Illness       Reason for visit:  Bladder infection  Symptom onset:  3-7 days ago  Symptoms include:  Frequent urination, pain during urination  Symptom intensity:  Moderate  Symptom progression:  Staying the same  Had these symptoms before:  Yes  Has tried/received treatment for these symptoms:  No  Previous treatment was successful:  Yes  Prior treatment description:  Tried using monistat- no real pain  What makes it worse:  No  What makes it better:  No    She eats 0-1 servings of fruits and vegetables daily.She consumes 0 sweetened beverage(s) daily.She exercises with enough effort to increase her heart rate 10 to 19 minutes per day.  She exercises with enough effort to increase her heart rate 3 or less days per week.   She is taking medications regularly.             Review of Systems         Objective           Vitals:  No vitals were obtained today due to virtual visit.    Physical Exam   GENERAL: Healthy, alert and no distress                  Video-Visit Details    Type of service:  Video Visit     Originating Location (pt. Location): Home    Distant Location (provider location):  On-site  Platform used for Video Visit: [employer]urop[employer]

## 2023-06-07 LAB — BACTERIA UR CULT: ABNORMAL

## 2023-07-10 ENCOUNTER — PATIENT OUTREACH (OUTPATIENT)
Dept: CARE COORDINATION | Facility: CLINIC | Age: 53
End: 2023-07-10
Payer: COMMERCIAL

## 2023-08-09 ENCOUNTER — MYC MEDICAL ADVICE (OUTPATIENT)
Dept: INTERNAL MEDICINE | Facility: CLINIC | Age: 53
End: 2023-08-09
Payer: COMMERCIAL

## 2023-08-09 DIAGNOSIS — M85.60 CYST, BONE: Primary | ICD-10-CM

## 2023-08-10 ENCOUNTER — HOSPITAL ENCOUNTER (OUTPATIENT)
Dept: MAMMOGRAPHY | Facility: CLINIC | Age: 53
Discharge: HOME | End: 2023-08-10
Attending: INTERNAL MEDICINE | Admitting: INTERNAL MEDICINE
Payer: COMMERCIAL

## 2023-08-10 DIAGNOSIS — Z12.31 VISIT FOR SCREENING MAMMOGRAM: ICD-10-CM

## 2023-08-10 PROCEDURE — 77063 BREAST TOMOSYNTHESIS BI: CPT

## 2023-08-10 NOTE — TELEPHONE ENCOUNTER
Referral faxed to number below.    Pt informed via [employer] of provider's message below and that referral faxed.

## 2023-08-10 NOTE — TELEPHONE ENCOUNTER
Please see patient's mychart messages below.     Having ortho surgery 8/16/23 with Dr. Cortez and insurance is requiring a referral to [facility] - Dr. Bharti Cortez from primary care provider.    Then need to fax referral to [phone].    Please advise, thanks.

## 2023-08-14 ENCOUNTER — MEDICAL CORRESPONDENCE (OUTPATIENT)
Dept: HEALTH INFORMATION MANAGEMENT | Facility: CLINIC | Age: 53
End: 2023-08-14

## 2023-08-14 ENCOUNTER — TELEPHONE (OUTPATIENT)
Dept: INTERNAL MEDICINE | Facility: CLINIC | Age: 53
End: 2023-08-14

## 2023-08-14 ENCOUNTER — TELEPHONE (OUTPATIENT)
Dept: INTERNAL MEDICINE | Facility: CLINIC | Age: 53
End: 2023-08-14
Payer: COMMERCIAL

## 2023-08-14 DIAGNOSIS — M85.60 CYST, BONE: Primary | ICD-10-CM

## 2023-08-14 NOTE — TELEPHONE ENCOUNTER
Fax received from [facility] - Referral Request Form for review and signature.  Put in Dr. Gonzalez's in basket.

## 2023-08-16 ENCOUNTER — LAB REQUISITION (OUTPATIENT)
Dept: LAB | Facility: CLINIC | Age: 53
End: 2023-08-16
Payer: COMMERCIAL

## 2023-08-16 DIAGNOSIS — M67.441 GANGLION, RIGHT HAND: ICD-10-CM

## 2023-08-16 PROCEDURE — 88304 TISSUE EXAM BY PATHOLOGIST: CPT | Mod: TC,ORL | Performed by: ORTHOPAEDIC SURGERY

## 2023-08-16 PROCEDURE — 88304 TISSUE EXAM BY PATHOLOGIST: CPT | Mod: 26 | Performed by: PATHOLOGY

## 2023-08-21 LAB
PATH REPORT.COMMENTS IMP SPEC: NORMAL
PATH REPORT.COMMENTS IMP SPEC: NORMAL
PATH REPORT.FINAL DX SPEC: NORMAL
PATH REPORT.GROSS SPEC: NORMAL
PATH REPORT.MICROSCOPIC SPEC OTHER STN: NORMAL
PATH REPORT.RELEVANT HX SPEC: NORMAL
PHOTO IMAGE: NORMAL

## 2023-08-30 DIAGNOSIS — J45.30 MILD PERSISTENT ASTHMA WITHOUT COMPLICATION: ICD-10-CM

## 2023-08-30 RX ORDER — FLUTICASONE PROPIONATE AND SALMETEROL 250; 50 UG/1; UG/1
1 POWDER RESPIRATORY (INHALATION) EVERY 12 HOURS
Qty: 60 EACH | Refills: 1 | Status: SHIPPED | OUTPATIENT
Start: 2023-08-30

## 2023-10-10 ENCOUNTER — PATIENT OUTREACH (OUTPATIENT)
Dept: CARE COORDINATION | Facility: CLINIC | Age: 53
End: 2023-10-10
Payer: COMMERCIAL

## 2023-11-30 ENCOUNTER — OFFICE VISIT (OUTPATIENT)
Dept: INTERNAL MEDICINE | Facility: CLINIC | Age: 53
End: 2023-11-30
Payer: COMMERCIAL

## 2023-11-30 VITALS
DIASTOLIC BLOOD PRESSURE: 72 MMHG | HEIGHT: 64 IN | WEIGHT: 116 LBS | RESPIRATION RATE: 18 BRPM | HEART RATE: 58 BPM | SYSTOLIC BLOOD PRESSURE: 107 MMHG | OXYGEN SATURATION: 99 % | BODY MASS INDEX: 19.81 KG/M2

## 2023-11-30 DIAGNOSIS — J45.30 MILD PERSISTENT ASTHMA WITHOUT COMPLICATION: ICD-10-CM

## 2023-11-30 DIAGNOSIS — L50.9 URTICARIA: ICD-10-CM

## 2023-11-30 DIAGNOSIS — Z00.00 ANNUAL PHYSICAL EXAM: Primary | ICD-10-CM

## 2023-11-30 PROCEDURE — 99396 PREV VISIT EST AGE 40-64: CPT | Performed by: INTERNAL MEDICINE

## 2023-11-30 PROCEDURE — 99213 OFFICE O/P EST LOW 20 MIN: CPT | Mod: 25 | Performed by: INTERNAL MEDICINE

## 2023-11-30 RX ORDER — PREDNISONE 20 MG/1
TABLET ORAL
Qty: 20 TABLET | Refills: 0 | Status: SHIPPED | OUTPATIENT
Start: 2023-11-30 | End: 2024-02-21

## 2023-11-30 ASSESSMENT — PATIENT HEALTH QUESTIONNAIRE - PHQ9
10. IF YOU CHECKED OFF ANY PROBLEMS, HOW DIFFICULT HAVE THESE PROBLEMS MADE IT FOR YOU TO DO YOUR WORK, TAKE CARE OF THINGS AT HOME, OR GET ALONG WITH OTHER PEOPLE: NOT DIFFICULT AT ALL
SUM OF ALL RESPONSES TO PHQ QUESTIONS 1-9: 0
SUM OF ALL RESPONSES TO PHQ QUESTIONS 1-9: 0

## 2023-11-30 ASSESSMENT — ENCOUNTER SYMPTOMS
SORE THROAT: 0
ARTHRALGIAS: 0
NERVOUS/ANXIOUS: 0
SHORTNESS OF BREATH: 0
PALPITATIONS: 0
JOINT SWELLING: 0
DYSURIA: 0
EYE PAIN: 0
BREAST MASS: 0
COUGH: 0
HEMATOCHEZIA: 0
FREQUENCY: 0
FEVER: 0
WEAKNESS: 0
CHILLS: 0
HEADACHES: 0
NAUSEA: 0
HEMATURIA: 0
DIARRHEA: 0
PARESTHESIAS: 0
DIZZINESS: 0
CONSTIPATION: 0
HEARTBURN: 0
ABDOMINAL PAIN: 0
MYALGIAS: 0

## 2023-11-30 ASSESSMENT — ANXIETY QUESTIONNAIRES
7. FEELING AFRAID AS IF SOMETHING AWFUL MIGHT HAPPEN: NOT AT ALL
5. BEING SO RESTLESS THAT IT IS HARD TO SIT STILL: NOT AT ALL
GAD7 TOTAL SCORE: 0
6. BECOMING EASILY ANNOYED OR IRRITABLE: NOT AT ALL
3. WORRYING TOO MUCH ABOUT DIFFERENT THINGS: NOT AT ALL
8. IF YOU CHECKED OFF ANY PROBLEMS, HOW DIFFICULT HAVE THESE MADE IT FOR YOU TO DO YOUR WORK, TAKE CARE OF THINGS AT HOME, OR GET ALONG WITH OTHER PEOPLE?: NOT DIFFICULT AT ALL
GAD7 TOTAL SCORE: 0
IF YOU CHECKED OFF ANY PROBLEMS ON THIS QUESTIONNAIRE, HOW DIFFICULT HAVE THESE PROBLEMS MADE IT FOR YOU TO DO YOUR WORK, TAKE CARE OF THINGS AT HOME, OR GET ALONG WITH OTHER PEOPLE: NOT DIFFICULT AT ALL
7. FEELING AFRAID AS IF SOMETHING AWFUL MIGHT HAPPEN: NOT AT ALL
1. FEELING NERVOUS, ANXIOUS, OR ON EDGE: NOT AT ALL
4. TROUBLE RELAXING: NOT AT ALL
2. NOT BEING ABLE TO STOP OR CONTROL WORRYING: NOT AT ALL
GAD7 TOTAL SCORE: 0

## 2023-11-30 NOTE — PATIENT INSTRUCTIONS
Preventive Health Recommendations  Female Ages 50 - 64    Yearly exam: See your health care provider every year in order to  Review health changes.   Discuss preventive care.    Review your medicines if your doctor has prescribed any.    Get a Pap test every three years (unless you have an abnormal result and your provider advises testing more often).  If you get Pap tests with HPV test, you only need to test every 5 years, unless you have an abnormal result.   You do not need a Pap test if your uterus was removed (hysterectomy) and you have not had cancer.  You should be tested each year for STDs (sexually transmitted diseases) if you're at risk.   Have a mammogram every 1 to 2 years.  Have a colonoscopy at age 45, or have a yearly FIT test (stool test). These exams screen for colon cancer.    Have a cholesterol test every 5 years, or more often if advised.  Have a diabetes test (fasting glucose) every three years. If you are at risk for diabetes, you should have this test more often.   If you are at risk for osteoporosis (brittle bone disease), think about having a bone density scan (DEXA).    Shots: Get a flu shot each year. Get a tetanus shot every 10 years.    Nutrition:   Eat at least 5 servings of fruits and vegetables each day.  Eat whole-grain bread, whole-wheat pasta and brown rice instead of white grains and rice.  Get adequate Calcium and Vitamin D.     Lifestyle  Exercise at least 150 minutes a week (30 minutes a day, 5 days a week). This will help you control your weight and prevent disease.  Limit alcohol to one drink per day.  No smoking.   Wear sunscreen to prevent skin cancer.   See your dentist every six months for an exam and cleaning.  See your eye doctor every 1 to 2 years.  My Asthma Action Plan    Name: Jie Siddiqui   YOB: 1970  Date: 11/30/2023   My doctor: Dario Gonzalez MD   My clinic: [facility]        My Control Medicine: Fluticasone  propionate + salmeterol (Advair Diskus or Wixela Inhub) -  250/50 mcg 1 puff BID  My Rescue Medicine: Albuterol (Proair/Ventolin/Proventil HFA) 2-4 puffs EVERY 4 HOURS as needed. Use a spacer if recommended by your provider.   My Asthma Severity:   Mild Persistent  Know your asthma triggers: smoke and upper respiratory infections he has erythematous               GREEN ZONE   Good Control  I feel good  No cough or wheeze  Can work, sleep and play without asthma symptoms       Take your asthma control medicine every day.     If exercise triggers your asthma, take your rescue medication  15 minutes before exercise or sports, and  During exercise if you have asthma symptoms  Spacer to use with inhaler: If you have a spacer, make sure to use it with your inhaler             YELLOW ZONE Getting Worse  I have ANY of these:  I do not feel good  Cough or wheeze  Chest feels tight  Wake up at night   Keep taking your Green Zone medications  Start taking your rescue medicine:  every 20 minutes for up to 1 hour. Then every 4 hours for 24-48 hours.  If you stay in the Yellow Zone for more than 12-24 hours, contact your doctor.  If you do not return to the Green Zone in 12-24 hours or you get worse, start taking your oral steroid medicine if prescribed by your provider.           RED ZONE Medical Alert - Get Help  I have ANY of these:  I feel awful  Medicine is not helping  Breathing getting harder  Trouble walking or talking  Nose opens wide to breathe       Take your rescue medicine NOW  If your provider has prescribed an oral steroid medicine, start taking it NOW  Call your doctor NOW  If you are still in the Red Zone after 20 minutes and you have not reached your doctor:  Take your rescue medicine again and  Call 911 or go to the emergency room right away    See your regular doctor within 2 weeks of an Emergency Room or Urgent Care visit for follow-up treatment.          Annual Reminders:  Meet with Asthma Educator,  Flu  Shot in the Fall, consider Pneumonia Vaccination for patients with asthma (aged 19 and older).    Pharmacy: [id number] [place], [place] - [phone]    Electronically signed by Dario Gonzalez MD   Date: 11/30/23                      Asthma Triggers  How To Control Things That Make Your Asthma Worse    Triggers are things that make your asthma worse.  Look at the list below to help you find your triggers and what you can do about them.  You can help prevent asthma flare-ups by staying away from your triggers.      Trigger                                                          What you can do   Cigarette Smoke  Tobacco smoke can make asthma worse. Do not allow smoking in your home, car or around you.  Be sure no one smokes at a child s day care or school.  If you smoke, ask your health care provider for ways to help you quit.  Ask family members to quit too.  Ask your health care provider for a referral to Quit Plan to help you quit smoking, or call 7-210-955-PLAN.     Colds, Flu, Bronchitis  These are common triggers of asthma. Wash your hands often.  Don t touch your eyes, nose or mouth.  Get a flu shot every year.     Dust Mites  These are tiny bugs that live in cloth or carpet. They are too small to see. Wash sheets and blankets in hot water every week.   Encase pillows and mattress in dust mite proof covers.  Avoid having carpet if you can. If you have carpet, vacuum weekly.   Use a dust mask and HEPA vacuum.   Pollen and Outdoor Mold  Some people are allergic to trees, grass, or weed pollen, or molds. Try to keep your windows closed.  Limit time out doors when pollen count is high.   Ask you health care provider about taking medicine during allergy season.     Animal Dander  Some people are allergic to skin flakes, urine or saliva from pets with fur or feathers. Keep pets with fur or feathers out of your home.    If you can t keep the pet outdoors, then  keep the pet out of your bedroom.  Keep the bedroom door closed.  Keep pets off cloth furniture and away from stuffed toys.     Mice, Rats, and Cockroaches   Some people are allergic to the waste from these pests.   Cover food and garbage.  Clean up spills and food crumbs.  Store grease in the refrigerator.   Keep food out of the bedroom.   Indoor Mold  This can be a trigger if your home has high moisture. Fix leaking faucets, pipes, or other sources of water.   Clean moldy surfaces.  Dehumidify basement if it is damp and smelly.   Smoke, Strong Odors, and Sprays  These can reduce air quality. Stay away from strong odors and sprays, such as perfume, powder, hair spray, paints, smoke incense, paint, cleaning products, candles and new carpet.   Exercise or Sports  Some people with asthma have this trigger. Be active!  Ask your doctor about taking medicine before sports or exercise to prevent symptoms.    Warm up for 5-10 minutes before and after sports or exercise.     Other Triggers of Asthma  Cold air:  Cover your nose and mouth with a scarf.  Sometimes laughing or crying can be a trigger.  Some medicines and food can trigger asthma.

## 2023-11-30 NOTE — PROGRESS NOTES
SUBJECTIVE:   Jie is a 53 year old, presenting for the following:  Physical        Patient is a 53-year-old  female who presents to the clinic for her annual physical.  Her main concern today is in regards to a persistent rash that has been present for approximately 1 month.  Patient states that her rash did began after she received her annual influenza and COVID booster in October 2023.  Patient states that she initially developed some areas of redness and itchiness on her chest and upper extremities.  This rash has subsequent spread down along her abdomen and lower extremities as well.  She has been applying cortisone cream with minimal improvement in her symptoms.  Patient is wondering what she can do to help resolve the itching and the rash.  Otherwise, she is doing well.  Patient does have a history of mild persistent asthma.  She does utilize Wixela 250 mcg / 50 mcg per actuation with instruction to perform 1 puff twice per day for a controller therapy.  She states that she very rarely requires use of her as needed albuterol inhaler.  Patient reports stable appetite.  She is stooling voiding without issue.  She did elect to defer any lab work at this time.    Healthy Habits:     Getting at least 3 servings of Calcium per day:  Yes    Bi-annual eye exam:  Yes    Dental care twice a year:  Yes    Sleep apnea or symptoms of sleep apnea:  None    Diet:  Regular (no restrictions)    Frequency of exercise:  1 day/week    Duration of exercise:  30-45 minutes    Taking medications regularly:  Yes    Medication side effects:  Not applicable    Additional concerns today:  No      Today's PHQ-9 Score:       11/30/2023     7:27 AM   PHQ-9 SCORE   PHQ-9 Total Score MyChart 0   PHQ-9 Total Score 0         Social History     Tobacco Use    Smoking status: Never    Smokeless tobacco: Never   Substance Use Topics    Alcohol use: Yes     Alcohol/week: 2.0 standard drinks of alcohol     Comment: 2-3 x/wk. only 2  glasses of wine           2023     7:31 AM   Alcohol Use   Prescreen: >3 drinks/day or >7 drinks/week? No     Reviewed orders with patient.  Reviewed health maintenance and updated orders accordingly - Yes  Labs reviewed in EPIC    Breast Cancer Screenin/2/2022     9:13 AM 3/6/2023    11:10 AM   Breast CA Risk Assessment (FHS-7)   Do you have a family history of breast, colon, or ovarian cancer? Yes No / Unknown       click delete button to remove this line now  Mammogram Screening: Recommended annual mammography  Pertinent mammograms are reviewed under the imaging tab.    History of abnormal Pap smear: NO - age 30-65 PAP every 5 years with negative HPV co-testing recommended      10/29/2012     7:06 PM 10/17/2011    10:48 AM 2010    12:00 AM   PAP / HPV   PAP (Historical) NIL  NIL  NIL      Reviewed and updated as needed this visit by clinical staff                  Reviewed and updated as needed this visit by Provider                   Review of Systems   Constitutional:  Negative for chills and fever.   HENT:  Negative for congestion, ear pain, hearing loss and sore throat.    Eyes:  Negative for pain and visual disturbance.   Respiratory:  Negative for cough and shortness of breath.    Cardiovascular:  Negative for chest pain, palpitations and peripheral edema.   Gastrointestinal:  Negative for abdominal pain, constipation, diarrhea, heartburn, hematochezia and nausea.   Breasts:  Negative for tenderness, breast mass and discharge.   Genitourinary:  Negative for dysuria, frequency, genital sores, hematuria, pelvic pain, urgency, vaginal bleeding and vaginal discharge.   Musculoskeletal:  Negative for arthralgias, joint swelling and myalgias.   Skin:  Positive for rash.   Neurological:  Negative for dizziness, weakness, headaches and paresthesias.   Psychiatric/Behavioral:  Negative for mood changes. The patient is not nervous/anxious.         OBJECTIVE:   Blood pressure 107/72, pulse 58,  "resp. rate 18, height 1.626 m (5' 4\"), weight 52.6 kg (116 lb), last menstrual period 11/22/2013, SpO2 99%, not currently breastfeeding.    Physical Exam  Vitals reviewed.   HENT:      Head: Normocephalic and atraumatic.      Right Ear: Tympanic membrane, ear canal and external ear normal.      Left Ear: Tympanic membrane, ear canal and external ear normal.      Mouth/Throat:      Mouth: Mucous membranes are moist.      Pharynx: Oropharynx is clear.   Eyes:      Extraocular Movements: Extraocular movements intact.      Conjunctiva/sclera: Conjunctivae normal.      Pupils: Pupils are equal, round, and reactive to light.   Cardiovascular:      Rate and Rhythm: Normal rate and regular rhythm.      Pulses: Normal pulses.      Heart sounds: Normal heart sounds.   Pulmonary:      Effort: Pulmonary effort is normal.      Breath sounds: Normal breath sounds.   Abdominal:      General: Bowel sounds are normal.      Palpations: Abdomen is soft.   Musculoskeletal:         General: Normal range of motion.      Cervical back: Normal range of motion and neck supple.   Skin:     Capillary Refill: Capillary refill takes less than 2 seconds.      Findings: Rash (Elevated areas of erythematous skin of varying sizes located on her upper and lower extremities as well as her trunk.  Some signs of excoriation noted.  No pustules or vesicles appreciated.) present.   Neurological:      General: No focal deficit present.      Mental Status: She is alert and oriented to person, place, and time.   Psychiatric:         Attention and Perception: Attention and perception normal.         Mood and Affect: Mood and affect normal.         Speech: Speech normal.      Comments: LUCIANO-7 score of 0       Diagnostic Test Results:  Labs reviewed in Epic    ASSESSMENT/PLAN:   (Z00.00) Annual physical exam  (primary encounter diagnosis)  Comment: At this time, patient does have a relatively unremarkable physical examination.  Her blood pressure is noted to be " at an acceptable level.  We did spend some time discussing appropriate dietary and lifestyle modification to help keep her weight and blood pressure under good control.  Patient did elect to defer any fasting labs at this visit.  All health maintenance items were addressed.    (J45.30) Mild persistent asthma without complication  Comment: Patient's asthma does appear to be under good control.  We will continue her Wixela at 250/50 mcg per actuation with instruction to perform 1 puff twice per day as a controller therapy.  She can continue her albuterol inhaler on an as-needed basis.  Side effects of each medication were reviewed.  Appropriate inhaler technique was discussed.  Asthma action plan completed today.    (L50.9) Urticaria  Comment: At this time, patient does have a rash consistent with urticaria noted on her trunk and extremities.  After much discussion, we did elect to proceed with a prednisone taper to see if we can help resolve this histamine reaction.  Side effects prednisone use were reviewed.  Patient had no further questions or concerns this regard.        Patient has been advised of split billing requirements and indicates understanding: Yes      COUNSELING:  Reviewed preventive health counseling, as reflected in patient instructions        She reports that she has never smoked. She has never used smokeless tobacco.      Dario Gonzalez MD  [facility]  Answers submitted by the patient for this visit:  Patient Health Questionnaire (Submitted on 11/30/2023)  If you checked off any problems, how difficult have these problems made it for you to do your work, take care of things at home, or get along with other people?: Not difficult at all  PHQ9 TOTAL SCORE: 0  LUCIANO-7 (Submitted on 11/30/2023)  LUCIANO 7 TOTAL SCORE: 0

## 2024-02-21 ENCOUNTER — VIRTUAL VISIT (OUTPATIENT)
Dept: FAMILY MEDICINE | Facility: CLINIC | Age: 54
End: 2024-02-21
Payer: COMMERCIAL

## 2024-02-21 ENCOUNTER — NURSE TRIAGE (OUTPATIENT)
Dept: INTERNAL MEDICINE | Facility: CLINIC | Age: 54
End: 2024-02-21

## 2024-02-21 DIAGNOSIS — J45.30 MILD PERSISTENT ASTHMA WITHOUT COMPLICATION: ICD-10-CM

## 2024-02-21 DIAGNOSIS — U07.1 INFECTION DUE TO 2019 NOVEL CORONAVIRUS: Primary | ICD-10-CM

## 2024-02-21 PROCEDURE — 99213 OFFICE O/P EST LOW 20 MIN: CPT | Mod: 95 | Performed by: FAMILY MEDICINE

## 2024-02-21 ASSESSMENT — ASTHMA QUESTIONNAIRES
QUESTION_3 LAST FOUR WEEKS HOW OFTEN DID YOUR ASTHMA SYMPTOMS (WHEEZING, COUGHING, SHORTNESS OF BREATH, CHEST TIGHTNESS OR PAIN) WAKE YOU UP AT NIGHT OR EARLIER THAN USUAL IN THE MORNING: NOT AT ALL
QUESTION_5 LAST FOUR WEEKS HOW WOULD YOU RATE YOUR ASTHMA CONTROL: COMPLETELY CONTROLLED
QUESTION_4 LAST FOUR WEEKS HOW OFTEN HAVE YOU USED YOUR RESCUE INHALER OR NEBULIZER MEDICATION (SUCH AS ALBUTEROL): NOT AT ALL
ACT_TOTALSCORE: 25
QUESTION_2 LAST FOUR WEEKS HOW OFTEN HAVE YOU HAD SHORTNESS OF BREATH: NOT AT ALL
QUESTION_1 LAST FOUR WEEKS HOW MUCH OF THE TIME DID YOUR ASTHMA KEEP YOU FROM GETTING AS MUCH DONE AT WORK, SCHOOL OR AT HOME: NONE OF THE TIME
ACT_TOTALSCORE: 25

## 2024-02-21 NOTE — TELEPHONE ENCOUNTER
"PT calls stating she was tested positive for COVID. Did a COVID test this AM that was Positive.     She was in [place] last week. She had a bad headache last week. But didn't have any other symptoms, except being tired over the weekend.   Really started getting symptoms last night. Felt like a \"truck hit her last night\".   Yesterday her nose was running a lot.   She has chills but has not checked her temperature. This is the 4th time she had COVID.     Pt has history of Asthma. Advised to check her temperature. She agrees.     Pt asking if she would be eligible for Paxlovid. Has not tried this before.     Will route to COVID team.               "

## 2024-02-21 NOTE — PROGRESS NOTES
"    Instructions Relayed to Patient by Virtual Roomer:     Patient is active on [employer]:   Relayed following to patient: \"It looks like you are active on [employer], are you able to join the visit this way? If not, do you need us to send you a link now or would you like your provider to send a link via text or email when they are ready to initiate the visit?\"    Reminded patient to ensure they were logged on to virtual visit by arrival time listed. Documented in appointment notes if patient had flexibility to initiate visit sooner than arrival time. If pediatric virtual visit, ensured pediatric patient along with parent/guardian will be present for video visit.     Patient offered the website [email or web address] and/or phone number to [employer] Help line: [phone]    Jie is a 53 year old who is being evaluated via a billable video visit.      How would you like to obtain your AVS? [employer]  If the video visit is dropped, the invitation should be resent by: Text to cell phone: [phone]  Will anyone else be joining your video visit? No        Assessment & Plan     Infection due to 2019 novel coronavirus  Recommended to start on molnupiravir twice a day for 5 days  Recommended rest, push fluids, self-isolation for 5 days, patient will wear mask for additional 5 days after completion of the antivirals  Continue current inhalers for asthma  RTC in 1week if no better by then or sooner prn.  Offered Zofran for nausea as needed, patient declined  Patient verbalised understanding and is agreeable to the plan.    - molnupiravir (LAGEVRIO) 200 MG capsule; Take 4 capsules (800 mg) by mouth every 12 hours    Mild persistent asthma without complication  Stable on Wixela inhaler    Review of the result(s) of each unique test - BMP from 3/13/2023          Chart documentation done in part with Dragon Voice recognition Software. Although reviewed after completion, some word and grammatical error may " remain.    See Patient Instructions    Subjective   Jie is a 53 year old, presenting for the following health issues:  Medication Request (Covid Treatment)  Patient is here for a video visit instead of in person visit due to the current COVID-19 pandemic.  Patient is new to the provider, is here today to discuss about management of her positive COVID-19 testing that she did for symptoms of runny nose, headache, fever and chills since last night  Patient traveled to [place] last week and felt terrible headache and head pressure   denies cough, shortness of breath, wheezing past medical history significant for mild persistent asthma, on Wixela inhaler  Patient does not smoke  She also has some nausea and decreased appetite  Denies vomiting, diarrhea      2/21/2024     9:12 AM   Additional Questions   Roomed by Jake KING   Accompanied by Self     History of Present Illness       Reason for visit:  Covid Treatment        COVID-19 Symptom Review  How many days ago did these symptoms start? Symptoms started last night night    Are any of the following symptoms significant for you?  New or worsening difficulty breathing? No  Worsening cough? No  Fever or chills? Yes, I felt feverish or had chills.  Headache: YES  Sore throat: YES  Chest pain: No  Diarrhea: Upset stomach, decrease in appetite  Body aches? YES    What treatments has patient tried? Ibuprofen   Does patient live in a nursing home, group home, or shelter? No  Does patient have a way to get food/medications during quarantined? Yes, I have a friend or family member who can help me.                Review of Systems  CONSTITUTIONAL: As above  INTEGUMENTARY/SKIN: NEGATIVE for worrisome rashes, moles or lesions  EYES: NEGATIVE for vision changes or irritation  ENT/MOUTH: As above  RESP: History of asthma  CV: NEGATIVE for chest pain, palpitations or peripheral edema  GI: Nausea  MUSCULOSKELETAL: NEGATIVE for significant arthralgias or myalgia  NEURO: NEGATIVE for  weakness, dizziness or paresthesias  ENDOCRINE: NEGATIVE for temperature intolerance, skin/hair changes  HEME/ALLERGY/IMMUNE: NEGATIVE for bleeding problems  PSYCHIATRIC: NEGATIVE for changes in mood or affect      Objective           Vitals:  No vitals were obtained today due to virtual visit.    Physical Exam   GENERAL: alert and no distress  EYES: Eyes grossly normal to inspection  RESP: No audible wheeze, cough, or visible cyanosis.    NEURO: Cranial nerves grossly intact.  Mentation and speech appropriate for age.  PSYCH: Appropriate affect, tone, and pace of words          Video-Visit Details    Type of service:  Video Visit     Originating Location (pt. Location): Home    Distant Location (provider location):  On-site  Platform used for Video Visit: [employer]  Signed Electronically by: Cat Bennett MD

## 2024-04-16 ENCOUNTER — MYC MEDICAL ADVICE (OUTPATIENT)
Dept: INTERNAL MEDICINE | Facility: CLINIC | Age: 54
End: 2024-04-16
Payer: COMMERCIAL

## 2024-04-17 ENCOUNTER — VIRTUAL VISIT (OUTPATIENT)
Dept: INTERNAL MEDICINE | Facility: CLINIC | Age: 54
End: 2024-04-17
Payer: COMMERCIAL

## 2024-04-17 DIAGNOSIS — R09.89 CHEST CONGESTION: ICD-10-CM

## 2024-04-17 DIAGNOSIS — J45.20 MILD INTERMITTENT ASTHMA, UNSPECIFIED WHETHER COMPLICATED: ICD-10-CM

## 2024-04-17 DIAGNOSIS — U07.1 INFECTION DUE TO 2019 NOVEL CORONAVIRUS: Primary | ICD-10-CM

## 2024-04-17 PROCEDURE — 99214 OFFICE O/P EST MOD 30 MIN: CPT | Mod: 95

## 2024-04-17 RX ORDER — PREDNISONE 20 MG/1
40 TABLET ORAL DAILY
Qty: 10 TABLET | Refills: 0 | Status: SHIPPED | OUTPATIENT
Start: 2024-04-17 | End: 2024-04-22

## 2024-04-17 RX ORDER — ALBUTEROL SULFATE 90 UG/1
AEROSOL, METERED RESPIRATORY (INHALATION)
Qty: 8.5 G | Refills: 5 | Status: SHIPPED | OUTPATIENT
Start: 2024-04-17

## 2024-04-17 NOTE — TELEPHONE ENCOUNTER
"Patient returned call. Patient stated she did not understand why she couldn't get the medication to breath. She said she just saw a provider in Feb.     It was explained to the patient that who she saw in Feb is not a provider from [place] and her PCP office of [place] requested an appointment. Patient expressed frustration and stated \"just make the damn appt\".       A vv appointment was made for 3:30 today. Patient expressed frustration at the late appointment and stated \"oh well I've been unable to breath for 3 days what a few more hours\". Patient was offered to be transferred to scheduling to make an appointment elsewhere. Patient declined and stated again\" just make the damn appointment\"      Patient was informed that an MA/LPN would be calling to begin the charting and then at 3:30 she would receive a text for the appointment. Patient was informed this writer would alert MA/LPN that if she could be seen sooner, that would be ideal.     This write spoke with the LPN and informed her of this appointment.   "

## 2024-04-17 NOTE — PATIENT INSTRUCTIONS
Vitamin C 2,000 mg daily   Vitamin D 10,000 international unit(s) daily  Zinc 25 mg daily with food.   NAC to support lungs and immunity.

## 2024-04-17 NOTE — PROGRESS NOTES
Jie is a 54 year old who is being evaluated via a billable video visit.    How would you like to obtain your AVS? MyChart  If the video visit is dropped, the invitation should be resent by: Text to cell phone: [phone]  Will anyone else be joining your video visit? No      Assessment & Plan     (U07.1) Infection due to 2019 novel coronavirus  (primary encounter diagnosis)  Comment: Patient presents via telemedicine due to positive COVID-19 infection and ongoing wheezing shortness of breath and overall feeling sick.  As this is virtual I was unable to assess her lungs however the patient was audibly coughing during the virtual visit and could hear the shortness of breath in her voice.  Offered the patient a chest x-ray for her to complete today or tomorrow.  Order will be placed for tomorrow per the patient request.  Plan: XR Chest 2 Views, CANCELED: XR Chest 2 Views        X-ray pending.    (J45.20) Mild intermittent asthma, unspecified whether complicated  Comment: Patient does have a history of mild intermittent asthma which she typically uses an albuterol inhaler for.  Given the new acute illness and worsening shortness of breath and wheezing will refill her albuterol and also start her on a prednisone burst due to COVID-19.  Unable to validate if the patient has infection without being able to listen to her lungs or obtain chest x-ray today.  Offered chest x-ray today and the patient states she would might be able to come tomorrow.  Plan: albuterol (PROAIR HFA/PROVENTIL HFA/VENTOLIN         HFA) 108 (90 Base) MCG/ACT inhaler, predniSONE         (DELTASONE) 20 MG tablet, XR Chest 2 Views,         CANCELED: XR Chest 2 Views        Medication sent to pharmacy.  Discussed with the patient how to take the medication and to take it with food preferably protein.  Patient agrees with the plan.  Will come to the clinic tomorrow for a chest x-ray if prednisone does not seem to help.    (R09.89) Chest  congestion  Comment: Given the patient's diagnosis of COVID-19 and worsening shortness of breath and chest congestion advised that the patient should get a chest x-ray to verify she does not have pneumonia and needing antibiotics.  Order is placed for chest x-ray for tomorrow.  Plan: XR Chest 2 Views, CANCELED: XR Chest 2 Views        Imaging pending.  Will send antibiotics if patient has pneumonia.      30 minutes spent by me on the date of the encounter doing chart review, review of test results, interpretation of tests, patient visit, and documentation             Subjective   Jie is a 54 year old, presenting for the following health issues:    Sick for one week. Tested positive for Covid at home yesterday. . She just recently had it in February as well. This is the 4th or 5th time she has had It too. She works out, she eats well.   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   Very short of breath, and wheezing. She does have albuterol.   She has had symptoms for about a week or longer. Thought it was allergies but its just getting worse.   She has never had pneumonia for his in the past  She does have lung nodules and sees a pulmonologist for those.   Fever: no      Asthma and Covid Concern      4/17/2024    11:43 AM   Additional Questions   Roomed by FELICIA Martinez LPN   Accompanied by self         4/17/2024    11:43 AM   Patient Reported Additional Medications   Patient reports taking the following new medications none     HPI             Review of Systems  Constitutional, HEENT, cardiovascular, pulmonary, gi and gu systems are negative, except as otherwise noted.      Objective           Vitals:  No vitals were obtained today due to virtual visit.    Physical Exam   GENERAL: alert and no distress  EYES: Eyes grossly normal to inspection.  No discharge or erythema, or obvious scleral/conjunctival abnormalities.  RESP: expiratory wheezes bilateral and and inspiratory wheezes   SKIN: Visible skin clear. No  significant rash, abnormal pigmentation or lesions.  NEURO: Cranial nerves grossly intact.  Mentation and speech appropriate for age.  PSYCH: Appropriate affect, tone, and pace of words          Video-Visit Details    Type of service:  Video Visit   Originating Location (pt. Location): Home    Distant Location (provider location):  On-site  Platform used for Video Visit: [employer]  Signed Electronically by: ERICH Cook CNP

## 2024-04-18 ENCOUNTER — ANCILLARY PROCEDURE (OUTPATIENT)
Dept: GENERAL RADIOLOGY | Facility: CLINIC | Age: 54
End: 2024-04-18
Payer: COMMERCIAL

## 2024-04-18 DIAGNOSIS — J45.20 MILD INTERMITTENT ASTHMA, UNSPECIFIED WHETHER COMPLICATED: ICD-10-CM

## 2024-04-18 DIAGNOSIS — U07.1 INFECTION DUE TO 2019 NOVEL CORONAVIRUS: ICD-10-CM

## 2024-04-18 DIAGNOSIS — R09.89 CHEST CONGESTION: ICD-10-CM

## 2024-04-18 PROCEDURE — 71046 X-RAY EXAM CHEST 2 VIEWS: CPT | Mod: TC | Performed by: RADIOLOGY

## 2024-04-19 ENCOUNTER — TELEPHONE (OUTPATIENT)
Dept: INTERNAL MEDICINE | Facility: CLINIC | Age: 54
End: 2024-04-19
Payer: COMMERCIAL

## 2024-04-19 DIAGNOSIS — J18.9 PNEUMONIA OF LEFT LOWER LOBE DUE TO INFECTIOUS ORGANISM: Primary | ICD-10-CM

## 2024-04-19 RX ORDER — CEFDINIR 300 MG/1
300 CAPSULE ORAL 2 TIMES DAILY
Qty: 20 CAPSULE | Refills: 0 | Status: SHIPPED | OUTPATIENT
Start: 2024-04-19

## 2024-04-19 NOTE — TELEPHONE ENCOUNTER
Called patient and informed them of provider recommendations. Advised patient to go to ER if shortness of breath worsens or having chest pain. Patient verbalizes understanding.     Alda Hammer RN  [facility]

## 2024-04-19 NOTE — TELEPHONE ENCOUNTER
Will send in omnicef twice daily for 10 days     Take the prednisone in morning with food - something in her stomach besides the medication - even crackers   It will help open her lungs so she can breathe better and her secretions can be cleared out of her lungs better

## 2024-04-19 NOTE — TELEPHONE ENCOUNTER
Patient calls. She has Virtual Visit with MICAH Gonsalez on Wednesday for ongoing Covid symptoms. Patient had chest x-ray yesterday, she saw results stating there was possible pneumonia present. She has history of asthma and is wheezing more. She is feeling worse today and the albuterol is not helping. Staying in bed and resting does help keep her from getting short of breath. She is not sleeping well due to cough. Felt like fever was very high last night, states she was feeling hot and cold and then vomited. She did not check her temperature to know how high it was. She is taking Ibuprofen for fever and pain, but not helping at all with body aches.     She was given prescription for Prednisone by [facility], but reluctant to take this due to previous side effects. She states [facility] recommended she take Prednisone with protein to help, but patient states she not able to eat due to feeling poorly so she has not started this.     Both [facility] and PCP are out of the office today. Routed to providers to review for recommendations. Patient states she would start prednisone if this was still recommended.     Alda Hammer RN  [facility]

## 2024-04-22 DIAGNOSIS — U07.1 PNEUMONIA DUE TO 2019 NOVEL CORONAVIRUS: Primary | ICD-10-CM

## 2024-04-22 DIAGNOSIS — J12.82 PNEUMONIA DUE TO 2019 NOVEL CORONAVIRUS: Primary | ICD-10-CM

## 2024-04-22 RX ORDER — DOXYCYCLINE 100 MG/1
100 CAPSULE ORAL 2 TIMES DAILY
Qty: 14 CAPSULE | Refills: 0 | Status: SHIPPED | OUTPATIENT
Start: 2024-04-22 | End: 2024-04-23

## 2024-04-23 ENCOUNTER — TRANSCRIBE ORDERS (OUTPATIENT)
Dept: INTERNAL MEDICINE | Facility: CLINIC | Age: 54
End: 2024-04-23
Payer: COMMERCIAL

## 2024-04-23 ENCOUNTER — MYC MEDICAL ADVICE (OUTPATIENT)
Dept: INTERNAL MEDICINE | Facility: CLINIC | Age: 54
End: 2024-04-23
Payer: COMMERCIAL

## 2024-04-23 NOTE — TELEPHONE ENCOUNTER
[facility], see Telephone encounter on 4/19. [facility] faxed in Cefdinir on 4/19. Should she stay on this and cancel the Doxy?

## 2024-08-12 ENCOUNTER — HOSPITAL ENCOUNTER (OUTPATIENT)
Dept: MAMMOGRAPHY | Facility: CLINIC | Age: 54
Discharge: HOME | End: 2024-08-12
Attending: INTERNAL MEDICINE | Admitting: INTERNAL MEDICINE
Payer: COMMERCIAL

## 2024-08-12 DIAGNOSIS — Z12.31 VISIT FOR SCREENING MAMMOGRAM: ICD-10-CM

## 2024-08-12 PROCEDURE — 77063 BREAST TOMOSYNTHESIS BI: CPT

## 2024-10-20 ENCOUNTER — OFFICE VISIT (OUTPATIENT)
Dept: URGENT CARE | Facility: URGENT CARE | Age: 54
End: 2024-10-20
Payer: COMMERCIAL

## 2024-10-20 VITALS
TEMPERATURE: 97.9 F | BODY MASS INDEX: 21.71 KG/M2 | RESPIRATION RATE: 17 BRPM | SYSTOLIC BLOOD PRESSURE: 104 MMHG | HEART RATE: 76 BPM | WEIGHT: 126.5 LBS | DIASTOLIC BLOOD PRESSURE: 71 MMHG | OXYGEN SATURATION: 99 %

## 2024-10-20 DIAGNOSIS — B37.31 YEAST INFECTION OF THE VAGINA: ICD-10-CM

## 2024-10-20 DIAGNOSIS — R35.0 URINARY FREQUENCY: ICD-10-CM

## 2024-10-20 DIAGNOSIS — N39.0 URINARY TRACT INFECTION WITHOUT HEMATURIA, SITE UNSPECIFIED: Primary | ICD-10-CM

## 2024-10-20 LAB
ALBUMIN UR-MCNC: ABNORMAL MG/DL
APPEARANCE UR: CLEAR
BACTERIA #/AREA URNS HPF: ABNORMAL /HPF
BILIRUB UR QL STRIP: NEGATIVE
COLOR UR AUTO: YELLOW
GLUCOSE UR STRIP-MCNC: NEGATIVE MG/DL
HGB UR QL STRIP: NEGATIVE
KETONES UR STRIP-MCNC: NEGATIVE MG/DL
LEUKOCYTE ESTERASE UR QL STRIP: NEGATIVE
NITRATE UR QL: NEGATIVE
PH UR STRIP: 8.5 [PH] (ref 5–7)
RBC #/AREA URNS AUTO: ABNORMAL /HPF
SP GR UR STRIP: 1.02 (ref 1–1.03)
SQUAMOUS #/AREA URNS AUTO: ABNORMAL /LPF
UROBILINOGEN UR STRIP-ACNC: 0.2 E.U./DL
WBC #/AREA URNS AUTO: ABNORMAL /HPF

## 2024-10-20 PROCEDURE — 99213 OFFICE O/P EST LOW 20 MIN: CPT | Performed by: NURSE PRACTITIONER

## 2024-10-20 PROCEDURE — 81001 URINALYSIS AUTO W/SCOPE: CPT | Performed by: NURSE PRACTITIONER

## 2024-10-20 RX ORDER — FLUCONAZOLE 150 MG/1
150 TABLET ORAL
Qty: 3 TABLET | Refills: 0 | Status: SHIPPED | OUTPATIENT
Start: 2024-10-20 | End: 2024-10-27

## 2024-10-20 RX ORDER — NITROFURANTOIN 25; 75 MG/1; MG/1
100 CAPSULE ORAL 2 TIMES DAILY
Qty: 10 CAPSULE | Refills: 0 | Status: SHIPPED | OUTPATIENT
Start: 2024-10-20 | End: 2024-10-25

## 2024-10-20 ASSESSMENT — ENCOUNTER SYMPTOMS
CONSTITUTIONAL NEGATIVE: 1
EYES NEGATIVE: 1
RESPIRATORY NEGATIVE: 1
DYSURIA: 1
FLANK PAIN: 0
GASTROINTESTINAL NEGATIVE: 1
CARDIOVASCULAR NEGATIVE: 1

## 2024-10-20 NOTE — PATIENT INSTRUCTIONS
Take antibiotic (Macrobid) as directed. Alternate Tylenol and Ibuprofen as needed for fever or discomfort. May trial OTC Pyridium as needed. I recommend cranberry supplements as well. Increase fluids. Monitor for new or worsening symptoms.     UA is suggestive of acute cystitis. Lack of flank pain, fever, nausea make me less concerned for an ascending infection. Patient will be treated with Macrobid until cultures return and will adjust as necessary. Side effects reviewed and discussed. Consider OTC pyridium, cranberry juice and plenty of fluids.    Will only call of culture grows out bacteria not covered by the antibiotic     Diflucan 150 mg one tablet every 72 hours as needed for vaginal yeast infection symptoms. (Take while on antibiotic since known issue.)  
MAR

## 2024-10-20 NOTE — PROGRESS NOTES
Assessment & Plan       ICD-10-CM    1. Urinary tract infection without hematuria, site unspecified  N39.0 nitroFURantoin macrocrystal-monohydrate (MACROBID) 100 MG capsule      2. Urinary frequency  R35.0 UA Macroscopic with reflex to Microscopic and Culture - Lab Collect     UA Macroscopic with reflex to Microscopic and Culture - Lab Collect     UA Microscopic with Reflex to Culture      3. Yeast infection of the vagina  B37.31 fluconazole (DIFLUCAN) 150 MG tablet           Patient Instructions   Take antibiotic as directed. Alternate Tylenol and Ibuprofen as needed for fever or discomfort. May trial OTC Pyridium as needed. I recommend cranberry supplements as well. Increase fluids. Monitor for new or worsening symptoms.     UA is suggestive of acute cystitis. Lack of flank pain, fever, nausea make me less concerned for an ascending infection. Patient will be treated with Macrobid until cultures return and will adjust as necessary. Side effects reviewed and discussed. Consider OTC pyridium, cranberry juice and plenty of fluids.     Diflucan 150 mg one tablet every 72 hours as needed for vaginal yeast infection symptoms. (Take while on antibiotic since known issue.)    Follow up with any problems, questions or concerns or if symptoms worsen or fail to improve. Patient agreed to plan and verbalized understanding.     Results for orders placed or performed in visit on 10/20/24 (from the past 24 hour(s))   UA Macroscopic with reflex to Microscopic and Culture - Lab Collect    Specimen: Urine, Midstream   Result Value Ref Range    Color Urine Yellow Colorless, Straw, Light Yellow, Yellow    Appearance Urine Clear Clear    Glucose Urine Negative Negative mg/dL    Bilirubin Urine Negative Negative    Ketones Urine Negative Negative mg/dL    Specific Gravity Urine 1.020 1.003 - 1.035    Blood Urine Negative Negative    pH Urine 8.5 (H) 5.0 - 7.0    Protein Albumin Urine Trace (A) Negative mg/dL    Urobilinogen Urine 0.2  0.2, 1.0 E.U./dL    Nitrite Urine Negative Negative    Leukocyte Esterase Urine Negative Negative   UA Microscopic with Reflex to Culture   Result Value Ref Range    Bacteria Urine Few (A) None Seen /HPF    RBC Urine None Seen 0-2 /HPF /HPF    WBC Urine 0-5 0-5 /HPF /HPF    Squamous Epithelials Urine Few (A) None Seen /LPF    Narrative    Urine Culture not indicated       Subjective     Jie is a 54 year old female who presents to clinic today for the following health issues:  Chief Complaint   Patient presents with    UTI     55 yo F presents with the following complaint burning during urination  tx- monistat no relief      HPI  She has had burning with urination and increased urinary urgency over the last several days but more noticeable this morning.  Prior to this she states she had a vaginal yeast infection that she treated with Monistat.  She is unsure if this has completely cleared.  She states she does know when she is on antibiotics she tends to have vaginal yeast infections.  No abdominal pain, back pain, fevers, or other systemic symptoms.  She states she probably should be better about taking and hydrating fluids.      Review of Systems   Constitutional: Negative.    HENT: Negative.     Eyes: Negative.    Respiratory: Negative.     Cardiovascular: Negative.    Gastrointestinal: Negative.    Genitourinary:  Positive for dysuria and urgency. Negative for flank pain and vaginal pain.       Problem List:  2018-11: Anxiety  2011-03: Seasonal allergies  2011-03: Intermittent asthma  2010-10: CARDIOVASCULAR SCREENING; LDL GOAL LESS THAN 160  2008-06: Dysmenorrhea  2003-11: Abnormal maternal glucose tolerance, complicating pregnancy,   childbirth, or the puerperium, unspecified as to episode of care  2003-06: Encounter for supervision of other normal pregnancy  Endometriosis  Polycystic ovaries      Past Medical History:   Diagnosis Date    Arthritis Metatarsophalangeal joint with bone spurs    Asthma lifetime     Diabetes (H) gestational    Endometriosis 2000    Polycystic ovarian disease (PCOD) 2000    Thyroid disease related to pregnancy         Social History     Tobacco Use    Smoking status: Never    Smokeless tobacco: Never   Substance Use Topics    Alcohol use: Yes     Alcohol/week: 2.0 standard drinks of alcohol     Comment: 2-3 x/wk. only 2 glasses of wine           Objective    /71   Pulse 76   Temp 97.9  F (36.6  C)   Resp 17   Wt 57.4 kg (126 lb 8 oz)   LMP 11/22/2013 (Exact Date)   SpO2 99%   BMI 21.71 kg/m    Physical Exam  Vitals and nursing note reviewed.   Constitutional:       General: She is not in acute distress.     Appearance: Normal appearance.   HENT:      Head: Normocephalic and atraumatic.      Right Ear: Tympanic membrane and ear canal normal.      Left Ear: Tympanic membrane and ear canal normal.      Nose: Nose normal.      Mouth/Throat:      Mouth: Mucous membranes are moist.      Pharynx: Oropharynx is clear.   Eyes:      Extraocular Movements: Extraocular movements intact.      Conjunctiva/sclera: Conjunctivae normal.      Pupils: Pupils are equal, round, and reactive to light.   Cardiovascular:      Rate and Rhythm: Normal rate and regular rhythm.      Heart sounds: Normal heart sounds.   Pulmonary:      Effort: Pulmonary effort is normal.      Breath sounds: Normal breath sounds.   Abdominal:      General: Abdomen is flat. Bowel sounds are normal.      Palpations: Abdomen is soft.      Tenderness: There is no abdominal tenderness. There is no right CVA tenderness or left CVA tenderness.   Musculoskeletal:      Cervical back: Neck supple.   Skin:     General: Skin is warm and dry.   Neurological:      Mental Status: She is alert and oriented to person, place, and time.   Psychiatric:         Mood and Affect: Mood normal.         Behavior: Behavior normal.              Puja Barton NP

## 2024-10-31 ENCOUNTER — PATIENT OUTREACH (OUTPATIENT)
Dept: CARE COORDINATION | Facility: CLINIC | Age: 54
End: 2024-10-31
Payer: COMMERCIAL

## 2024-11-10 ENCOUNTER — OFFICE VISIT (OUTPATIENT)
Dept: URGENT CARE | Facility: URGENT CARE | Age: 54
End: 2024-11-10
Payer: COMMERCIAL

## 2024-11-10 ENCOUNTER — E-VISIT (OUTPATIENT)
Dept: URGENT CARE | Facility: CLINIC | Age: 54
End: 2024-11-10
Payer: COMMERCIAL

## 2024-11-10 VITALS
WEIGHT: 126.5 LBS | SYSTOLIC BLOOD PRESSURE: 104 MMHG | TEMPERATURE: 97.6 F | DIASTOLIC BLOOD PRESSURE: 60 MMHG | OXYGEN SATURATION: 97 % | HEART RATE: 63 BPM | RESPIRATION RATE: 16 BRPM | BODY MASS INDEX: 21.71 KG/M2

## 2024-11-10 DIAGNOSIS — N30.00 ACUTE CYSTITIS WITHOUT HEMATURIA: ICD-10-CM

## 2024-11-10 DIAGNOSIS — R39.89 URINARY PROBLEM: Primary | ICD-10-CM

## 2024-11-10 DIAGNOSIS — R30.0 DIFFICULT OR PAINFUL URINATION: Primary | ICD-10-CM

## 2024-11-10 LAB
ALBUMIN UR-MCNC: >=300 MG/DL
APPEARANCE UR: CLEAR
BACTERIA #/AREA URNS HPF: ABNORMAL /HPF
BILIRUB UR QL STRIP: ABNORMAL
COLOR UR AUTO: YELLOW
GLUCOSE UR STRIP-MCNC: 250 MG/DL
HGB UR QL STRIP: NEGATIVE
KETONES UR STRIP-MCNC: 15 MG/DL
LEUKOCYTE ESTERASE UR QL STRIP: ABNORMAL
NITRATE UR QL: POSITIVE
PH UR STRIP: 5 [PH] (ref 5–7)
RBC #/AREA URNS AUTO: ABNORMAL /HPF
SP GR UR STRIP: 1.01 (ref 1–1.03)
SQUAMOUS #/AREA URNS AUTO: ABNORMAL /LPF
UROBILINOGEN UR STRIP-ACNC: >=8 E.U./DL
WBC #/AREA URNS AUTO: ABNORMAL /HPF

## 2024-11-10 PROCEDURE — 99213 OFFICE O/P EST LOW 20 MIN: CPT | Performed by: FAMILY MEDICINE

## 2024-11-10 PROCEDURE — 81001 URINALYSIS AUTO W/SCOPE: CPT

## 2024-11-10 PROCEDURE — 87186 SC STD MICRODIL/AGAR DIL: CPT | Performed by: FAMILY MEDICINE

## 2024-11-10 PROCEDURE — 87086 URINE CULTURE/COLONY COUNT: CPT | Performed by: FAMILY MEDICINE

## 2024-11-10 RX ORDER — SULFAMETHOXAZOLE AND TRIMETHOPRIM 800; 160 MG/1; MG/1
1 TABLET ORAL 2 TIMES DAILY
Qty: 10 TABLET | Refills: 0 | Status: SHIPPED | OUTPATIENT
Start: 2024-11-10

## 2024-11-10 NOTE — PATIENT INSTRUCTIONS
Dear Jie Siddiqui,     After reviewing your responses, I would like you to come in for a urine test and vagina swab to make sure we treat you correctly. This urine test is to evaluate you for a possible urinary tract infection, and should be scheduled for today or tomorrow. Schedule a Lab Only appointment here.     Lab appointments are not available at most locations on the weekends. If no Lab Only appointment is available, you should be seen in any of our convenient Walk-in or Urgent Care Centers, which can be found on our website here.     You will receive instructions with your results in [place] once they are available.     If your symptoms worsen, you develop pain in your back or stomach, develop fevers, or are not improving in 5 days, please contact your primary care provider for an appointment or visit a Walk-in or Urgent Care Center to be seen.     Thanks again for choosing us as your health care partner,     Bharti Zaragoza MD

## 2024-11-10 NOTE — PROGRESS NOTES
ASSESSMENT/  PLAN:   Urinary problem     - UA Macroscopic with reflex to Microscopic and Culture - Clinic Collect  - Urine Microscopic Exam  - Urine Culture    Acute cystitis without hematuria     - sulfamethoxazole-trimethoprim (BACTRIM DS) 800-160 MG tablet; Take 1 tablet by mouth 2 times daily.     Drink plenty of fluids.  Prevention and treatment of UTI's discussed.Signs and symptoms of pyelonephritis mentioned.  Follow up with primary care physician if not improving    We discussed that a urine culture is being performed and that if we find that if there is a bacteria in the urine that is resistant to the prescribed antibiotic he/she will receive call to change the antibiotic to better cover the infection.          -------------------------------------------------------------------------------------------    SUBJECTIVE:  Chief Complaint   Patient presents with    Urgent Care     Painful urination, frequency x 1 day         Jie Siddiqui is a 54 year old female who  presents today for a possible UTI. Symptoms of dysuria, frequency, and burning have been going on for 1day(s).  Hematuria no.  sudden onset, still present, and constantand moderate.  There is no history of fever, chills, nausea or vomiting.  No history of vaginal  discharge. This patient does   have a history of occasional urinary tract infections. Patient denies long duration, rigors, flank pain, and temperature > 101 degrees F. or vaginal discharge, vaginal odor, and vaginal itching     Past Medical History:   Diagnosis Date    Arthritis Metatarsophalangeal joint with bone spurs    Asthma lifetime    Diabetes (H) gestational    Endometriosis 2000    Polycystic ovarian disease (PCOD) 2000    Thyroid disease related to pregnancy     Patient Active Problem List   Diagnosis    CARDIOVASCULAR SCREENING; LDL GOAL LESS THAN 160    Seasonal allergies    Intermittent asthma    Endometriosis    Polycystic ovaries    Anxiety       ALLERGIES:  Erythromycin  and Morphine    Current Outpatient Medications   Medication Sig Dispense Refill    albuterol (PROAIR HFA/PROVENTIL HFA/VENTOLIN HFA) 108 (90 Base) MCG/ACT inhaler INHALE 2 PUFFS INTO THE LUNGS EVERY 4 HOURS AS NEEDED FOR SHORTNESS OF BREATH 8.5 g 5    cefdinir (OMNICEF) 300 MG capsule Take 1 capsule (300 mg) by mouth 2 times daily 20 capsule 0    fluticasone (FLONASE) 50 MCG/ACT nasal spray Spray 1 spray into both nostrils 2 times daily as needed for rhinitis or allergies 16 g 0    fluticasone-salmeterol (WIXELA INHUB) 250-50 MCG/ACT inhaler INHALE 1 PUFF INTO THE LUNGS EVERY 12 HOURS 60 each 1    ibuprofen (ADVIL,MOTRIN) 200 MG tablet Take 200 mg by mouth every 4 hours as needed for pain 100 tablet 3    loratadine (CLARITIN) 10 MG tablet Take 10 mg by mouth daily      molnupiravir (LAGEVRIO) 200 MG capsule Take 4 capsules (800 mg) by mouth every 12 hours 40 each 0    montelukast (SINGULAIR) 10 MG tablet Take 1 tablet (10 mg) by mouth At Bedtime 90 tablet 3    Probiotic Product (PROBIOTIC DAILY PO)       sulfamethoxazole-trimethoprim (BACTRIM DS) 800-160 MG tablet Take 1 tablet by mouth 2 times daily. 10 tablet 0    VITAMIN D, CHOLECALCIFEROL, PO Take 5,000 Units by mouth daily       No current facility-administered medications for this visit.       Social History     Tobacco Use    Smoking status: Never    Smokeless tobacco: Never   Substance Use Topics    Alcohol use: Yes     Alcohol/week: 2.0 standard drinks of alcohol     Comment: 2-3 x/wk. only 2 glasses of wine       Family History   Problem Relation Age of Onset    Lipids Father     Prostate Cancer Father     Lung Cancer Mother 76        smoker    Liver Cancer Mother     Other Cancer Mother         small cell    Cancer Paternal Grandmother         breast cancer    Cerebrovascular Disease Paternal Grandmother     Lipids Paternal Grandmother     Breast Cancer Paternal Grandmother     Psychotic Disorder Maternal Aunt         bi polar    Cerebrovascular Disease  Paternal Grandfather     Asthma Paternal Grandfather     Mental Illness Other     Asthma No family hx of     C.A.D. No family hx of     Diabetes No family hx of     Hypertension No family hx of     Cancer - colorectal No family hx of     Prostate Cancer No family hx of     Alcohol/Drug No family hx of     Allergies No family hx of     Alzheimer Disease No family hx of     Anesthesia Reaction No family hx of     Arthritis No family hx of     Blood Disease No family hx of     Cardiovascular No family hx of     Colon Cancer No family hx of        ROS:   CONSTITUTIONAL:NEGATIVE for fever, chills,    INTEGUMENTARY/SKIN: NEGATIVE for worrisome rashes or lesions  EYES: NEGATIVE for vision changes or irritation  ENT/MOUTH: NEGATIVE for ear, mouth and throat problems    OBJECTIVE:  /60   Pulse 63   Temp 97.6  F (36.4  C) (Oral)   Resp 16   Wt 57.4 kg (126 lb 8 oz)   LMP 11/22/2013 (Exact Date)   SpO2 97%   BMI 21.71 kg/m    GENERAL APPEARANCE: healthy, alert and no distress  RESP: lungs clear to auscultation - no rales, rhonchi or wheezes  CV: regular rates and rhythm, normal S1 S2, no murmur noted  ABDOMEN:  soft, nontender, no HSM or masses and bowel sounds normal  BACK: No CVA tenderness  SKIN: no suspicious lesions or rashes    Results for orders placed or performed in visit on 11/10/24   UA Macroscopic with reflex to Microscopic and Culture - Clinic Collect     Status: Abnormal    Specimen: Urine, Midstream   Result Value Ref Range    Color Urine Yellow Colorless, Straw, Light Yellow, Yellow    Appearance Urine Clear Clear    Glucose Urine 250 (A) Negative mg/dL    Bilirubin Urine Moderate (A) Negative    Ketones Urine 15 (A) Negative mg/dL    Specific Gravity Urine 1.010 1.003 - 1.035    Blood Urine Negative Negative    pH Urine 5.0 5.0 - 7.0    Protein Albumin Urine >=300 (A) Negative mg/dL    Urobilinogen Urine >=8.0 (A) 0.2, 1.0 E.U./dL    Nitrite Urine Positive (A) Negative    Leukocyte Esterase Urine  Large (A) Negative   Urine Microscopic Exam     Status: Abnormal   Result Value Ref Range    Bacteria Urine Few (A) None Seen /HPF    RBC Urine 10-25 (A) 0-2 /HPF /HPF    WBC Urine 10-25 (A) 0-5 /HPF /HPF    Squamous Epithelials Urine Few (A) None Seen /LPF

## 2024-11-11 LAB — BACTERIA UR CULT: ABNORMAL

## 2024-11-12 ENCOUNTER — NURSE TRIAGE (OUTPATIENT)
Dept: INTERNAL MEDICINE | Facility: CLINIC | Age: 54
End: 2024-11-12
Payer: COMMERCIAL

## 2024-11-12 DIAGNOSIS — N39.0 URINARY TRACT INFECTION WITHOUT HEMATURIA, SITE UNSPECIFIED: Primary | ICD-10-CM

## 2024-11-12 RX ORDER — NITROFURANTOIN 25; 75 MG/1; MG/1
100 CAPSULE ORAL 2 TIMES DAILY
Qty: 14 CAPSULE | Refills: 0 | Status: SHIPPED | OUTPATIENT
Start: 2024-11-12

## 2024-11-12 NOTE — TELEPHONE ENCOUNTER
Bactrim should cover bacteria seen   Her culture was pan sensitive   So looks like it would respond to any antibiotic   Does she want to give it until the end of her prescription to see if symptoms resolve?     We could change to macrobid if she felt that worked better in past

## 2024-11-12 NOTE — TELEPHONE ENCOUNTER
Advised patient of provider recommendation via telephone. Patient would like alternative prescription for Macrobid instead as she felt her symptoms subsided much quicker with Macrobid.     Please advise if prescription can be sent.     Светлана WATERS 7:44 AM November 12, 2024   [facility]

## 2024-11-12 NOTE — TELEPHONE ENCOUNTER
Advised patient of provider recommendation via telephone.  Patient verbalizes she will schedule yearly appointment with PCP soon.     Summer RN 8:08 AM November 12, 2024   [facility]

## 2024-11-12 NOTE — TELEPHONE ENCOUNTER
Nurse Triage SBAR    Is this a 2nd Level Triage? YES, LICENSED PRACTITIONER REVIEW IS REQUIRED    Situation: Patient was prescribed Bactrim for UTI, still having symptoms     Background: Patient was seen 11/10/24 and diagnosed with UTI. Prescribed Bactrim. Had a previous UTI and yeast infection on 10/20 and was given macrobrid and diflucan.     Assessment: Patient reports she started medication on Sunday, has taken 5 doses but does not feel improvements. Does not feel worse but symptoms of urgency, frequency, and pain has not improved at all. Denies fever. She feels that typically by now she would start to feel better.     Per results Bactrim is appropriate for patient's culture.     Protocol Recommended Disposition:   No disposition on file.    Recommendation: Please advise if patient should give medication another day or 2 to work or should patient be seen again for re-evaluation?      Routed to provider    Does the patient meet one of the following criteria for ADS visit consideration? 16+ years old, with an [facility] PCP     TIP  Providers, please consider if this condition is appropriate for management at one of our Acute and Diagnostic Services sites.     If patient is a good candidate, please use dotphrase <dot>triageresponse and select Refer to ADS to document.  Reason for Disposition   Taking antibiotic < 72 hours (3 days) for UTI and painful urination or frequency is SAME (unchanged, not better)    Additional Information   Negative: Shock suspected (e.g., cold/pale/clammy skin, too weak to stand, low BP, rapid pulse)   Negative: Sounds like a life-threatening emergency to the triager   Negative: Unable to urinate (or only a few drops) > 4 hours and bladder feels very full (e.g., palpable bladder or strong urge to urinate)   Negative: Passing pure blood or large blood clots (i.e., size > a dime)  (Exceptions: Flecks, small strands, or pinkish-red color.)   Negative: Fever > 103 F (39.4 C)   Negative: Patient  sounds very sick or weak to the triager   Negative: SEVERE pain (e.g., excruciating) and no improvement 2 hours after pain medications   Negative: Fever > 100.0 F (37.8 C) and new-onset since starting antibiotics   Negative: Side (flank) or lower back pain and new-onset since starting antibiotics   Negative: Taking antibiotic > 24 hours for UTI and flank or lower back pain getting worse   Negative: Vomiting 2 or more times and interferes with taking oral antibiotic   Negative: Taking antibiotic > 24 hours for UTI (urinary tract or bladder infection) and fever persists (still has fever)   Negative: Taking antibiotic > 72 hours (3 days) for UTI and flank or lower back pain is SAME (unchanged, not better)   Negative: Taking antibiotic > 72 hours (3 days) for UTI and painful urination or frequency is SAME (unchanged, not better)   Negative: Patient wants to be seen   Negative: Diabetes mellitus or weak immune system (e.g., HIV positive, cancer chemo, splenectomy, organ transplant, chronic steroids)   Negative: Sickle cell disease   Negative: Taking antibiotic < 24 hours for UTI and fever persists (still has fever)    Protocols used: Urinary Tract Infection on Antibiotic Follow-up Call - Female-[facility]

## 2024-11-14 ENCOUNTER — PATIENT OUTREACH (OUTPATIENT)
Dept: CARE COORDINATION | Facility: CLINIC | Age: 54
End: 2024-11-14
Payer: COMMERCIAL

## 2024-12-04 ENCOUNTER — TELEPHONE (OUTPATIENT)
Dept: INTERNAL MEDICINE | Facility: CLINIC | Age: 54
End: 2024-12-04

## 2024-12-04 ENCOUNTER — OFFICE VISIT (OUTPATIENT)
Dept: INTERNAL MEDICINE | Facility: CLINIC | Age: 54
End: 2024-12-04
Payer: COMMERCIAL

## 2024-12-04 VITALS
BODY MASS INDEX: 22.14 KG/M2 | DIASTOLIC BLOOD PRESSURE: 54 MMHG | TEMPERATURE: 98 F | HEART RATE: 75 BPM | SYSTOLIC BLOOD PRESSURE: 112 MMHG | RESPIRATION RATE: 20 BRPM | WEIGHT: 129 LBS | OXYGEN SATURATION: 97 %

## 2024-12-04 DIAGNOSIS — H44.001 EYE INFECTION, RIGHT: ICD-10-CM

## 2024-12-04 DIAGNOSIS — Z11.3 SCREENING FOR STD (SEXUALLY TRANSMITTED DISEASE): ICD-10-CM

## 2024-12-04 DIAGNOSIS — N39.0 URINARY TRACT INFECTION WITHOUT HEMATURIA, SITE UNSPECIFIED: Primary | ICD-10-CM

## 2024-12-04 DIAGNOSIS — R73.09 ELEVATED GLUCOSE: ICD-10-CM

## 2024-12-04 LAB
BASOPHILS # BLD AUTO: 0.1 10E3/UL (ref 0–0.2)
BASOPHILS NFR BLD AUTO: 1 %
CLUE CELLS: ABNORMAL
EOSINOPHIL # BLD AUTO: 0.2 10E3/UL (ref 0–0.7)
EOSINOPHIL NFR BLD AUTO: 4 %
ERYTHROCYTE [DISTWIDTH] IN BLOOD BY AUTOMATED COUNT: 12.6 % (ref 10–15)
EST. AVERAGE GLUCOSE BLD GHB EST-MCNC: 114 MG/DL
HBA1C MFR BLD: 5.6 % (ref 0–5.6)
HCT VFR BLD AUTO: 38.4 % (ref 35–47)
HGB BLD-MCNC: 13.3 G/DL (ref 11.7–15.7)
IMM GRANULOCYTES # BLD: 0 10E3/UL
IMM GRANULOCYTES NFR BLD: 0 %
LYMPHOCYTES # BLD AUTO: 2.3 10E3/UL (ref 0.8–5.3)
LYMPHOCYTES NFR BLD AUTO: 41 %
MCH RBC QN AUTO: 32.4 PG (ref 26.5–33)
MCHC RBC AUTO-ENTMCNC: 34.6 G/DL (ref 31.5–36.5)
MCV RBC AUTO: 94 FL (ref 78–100)
MONOCYTES # BLD AUTO: 0.4 10E3/UL (ref 0–1.3)
MONOCYTES NFR BLD AUTO: 7 %
NEUTROPHILS # BLD AUTO: 2.6 10E3/UL (ref 1.6–8.3)
NEUTROPHILS NFR BLD AUTO: 47 %
PLATELET # BLD AUTO: 245 10E3/UL (ref 150–450)
RBC # BLD AUTO: 4.1 10E6/UL (ref 3.8–5.2)
RBC #/AREA URNS AUTO: ABNORMAL /HPF
SQUAMOUS #/AREA URNS AUTO: ABNORMAL /LPF
T PALLIDUM AB SER QL: NONREACTIVE
TRICHOMONAS, WET PREP: ABNORMAL
WBC # BLD AUTO: 5.6 10E3/UL (ref 4–11)
WBC #/AREA URNS AUTO: ABNORMAL /HPF
WBC'S/HIGH POWER FIELD, WET PREP: ABNORMAL
YEAST, WET PREP: ABNORMAL

## 2024-12-04 PROCEDURE — 83036 HEMOGLOBIN GLYCOSYLATED A1C: CPT

## 2024-12-04 PROCEDURE — 86780 TREPONEMA PALLIDUM: CPT

## 2024-12-04 PROCEDURE — 85025 COMPLETE CBC W/AUTO DIFF WBC: CPT

## 2024-12-04 PROCEDURE — 36415 COLL VENOUS BLD VENIPUNCTURE: CPT

## 2024-12-04 PROCEDURE — 99214 OFFICE O/P EST MOD 30 MIN: CPT

## 2024-12-04 PROCEDURE — 87491 CHLMYD TRACH DNA AMP PROBE: CPT

## 2024-12-04 PROCEDURE — 87210 SMEAR WET MOUNT SALINE/INK: CPT

## 2024-12-04 PROCEDURE — 87591 N.GONORRHOEAE DNA AMP PROB: CPT

## 2024-12-04 PROCEDURE — 81015 MICROSCOPIC EXAM OF URINE: CPT

## 2024-12-04 RX ORDER — POLYMYXIN B SULFATE AND TRIMETHOPRIM 1; 10000 MG/ML; [USP'U]/ML
1-2 SOLUTION OPHTHALMIC EVERY 4 HOURS
Qty: 10 ML | Refills: 0 | Status: SHIPPED | OUTPATIENT
Start: 2024-12-04 | End: 2024-12-11

## 2024-12-04 ASSESSMENT — PAIN SCALES - GENERAL: PAINLEVEL_OUTOF10: MODERATE PAIN (4)

## 2024-12-04 NOTE — TELEPHONE ENCOUNTER
Sunshine Vickers CNP saw pt today for Urinary problem. Pt was also supposed to leave a urine sample, to test for STI.   The tests were cancelled by . Per the notes, pt was unable to leave urine and told them that she doesn't feel that this is the reason for her symptoms.     Huddled with Sunshine. All of pts other tests are negative, UA, wet prep and lab tests.     Pt should return for STI testing. Call to pt and discussed. She agrees and will return to the lab for testing today     Will need to put new orders in chart.

## 2024-12-04 NOTE — PROGRESS NOTES
Assessment & Plan     Urinary tract infection without hematuria, site unspecified  Patient presents to the clinic for a chief complaint of urinary tract infection symptoms.  Patient recently treated for a urinary tract infection with Macrobid which did seem to cleared up but not all the way.  Urinalysis today was negative.  Wet prep was also negative.  We will get a complete blood count to ensure that there is no infection going on.  - Wet prep - Clinic Collect  - UA Microscopic with Reflex to Culture  - CBC with platelets and differential; Future  - CBC with platelets and differential    Eye infection, right  Patient does have an infection in her eye which is most likely a stye.  I will give her Polytrim eyedrops as she is allergic to erythromycin.  Patient agrees with plan.  - polymixin b-trimethoprim (POLYTRIM) 86267-0.1 UNIT/ML-% ophthalmic solution; Place 1-2 drops into the right eye every 4 hours for 7 days.    Elevated glucose  Patient was found to have elevated glucose as well as large amount of glucose in urine.  Clinical picture suggest possibly new onset diabetes.  Will get a hemoglobin A1c to rule out diabetes.  Patient agrees with plan.  - Hemoglobin A1c; Future  - Hemoglobin A1c    Screening for STD (sexually transmitted disease)  At this patient presents to the clinic for chief complaint of urinary tract infection symptoms.  Patient states she is not worried about sexually transmitted diseases however with the UA and wet prep being negative and overall health gonorrhea chlamydia and syphilis.  Patient agrees with the plan.    - Treponema Abs w Reflex to RPR and Titer; Future  - Treponema Abs w Reflex to RPR and Titer  -Gonorrhea and Chlamydia  PCR urine      30 minutes spent by me on the date of the encounter doing chart review, review of test results, interpretation of tests, patient visit, and documentation             Subjective   Jie is a 54 year old, presenting for the following health  issues:  Patient is here today for a complaint of UTI symptoms.   She doesn't feel great.   Symptoms: burning all the time. Frequency.   Tired, doesn't feel good. Sluggish now.   Anxiety now because she doesn't want to disrupt anything that could cause something else going on.   Everything feel irritated.   No discharge.   No blood.   Not constipated.   No appetite  Threw up the other day at work. When she was on Macrobid.   No fevers.   Had gestational diabetes.     She also has a stye in her eye. It drained with pus yesterday.     Urinary Problem        12/4/2024    10:43 AM   Additional Questions   Roomed by hope r   Accompanied by self         12/4/2024    10:43 AM   Patient Reported Additional Medications   Patient reports taking the following new medications monestat     HPI               Review of Systems  Constitutional, HEENT, cardiovascular, pulmonary, gi and gu systems are negative, except as otherwise noted.      Objective    /54 (BP Location: Right arm, Patient Position: Sitting, Cuff Size: Adult Regular)   Pulse 75   Temp 98  F (36.7  C) (Tympanic)   Resp 20   Wt 58.5 kg (129 lb)   LMP 11/22/2013 (Exact Date)   SpO2 97%   Breastfeeding No   BMI 22.14 kg/m    Body mass index is 22.14 kg/m .  Physical Exam   GENERAL: alert and no distress  RESP: lungs clear to auscultation - no rales, rhonchi or wheezes  CV: regular rate and rhythm, normal S1 S2, no S3 or S4, no murmur, click or rub, no peripheral edema  MS: no gross musculoskeletal defects noted, no edema            Signed Electronically by: ERICH Cook CNP

## 2024-12-04 NOTE — TELEPHONE ENCOUNTER
"Pt states she has urinary problem, \"uti\" off and on since October. Never completely cleared up.   She was on antibiotics a few weeks ago and she states it mostly went away, but she feels like it never goes away completely.     She feels tired, urinates all the time, for the last 2 days, has burning with urination. She is having anxiety because is at work and needs to go the bathroom.     Scheduled appt this AM with Sunshine. Pt wanted to be seen today.               "

## 2024-12-05 DIAGNOSIS — N94.89 VAGINAL BURNING: Primary | ICD-10-CM

## 2024-12-05 LAB
C TRACH DNA SPEC QL PROBE+SIG AMP: NEGATIVE
N GONORRHOEA DNA SPEC QL NAA+PROBE: NEGATIVE

## 2024-12-05 RX ORDER — MENTHOL AND ZINC OXIDE .44; 20.625 G/100G; G/100G
OINTMENT TOPICAL 4 TIMES DAILY PRN
Qty: 20 G | Refills: 0 | Status: SHIPPED | OUTPATIENT
Start: 2024-12-05

## 2024-12-05 RX ORDER — VALACYCLOVIR HYDROCHLORIDE 500 MG/1
500 TABLET, FILM COATED ORAL 2 TIMES DAILY
Qty: 6 TABLET | Refills: 0 | Status: SHIPPED | OUTPATIENT
Start: 2024-12-05 | End: 2024-12-08

## 2024-12-20 ENCOUNTER — OFFICE VISIT (OUTPATIENT)
Dept: OBGYN | Facility: CLINIC | Age: 54
End: 2024-12-20
Payer: COMMERCIAL

## 2024-12-20 VITALS
DIASTOLIC BLOOD PRESSURE: 60 MMHG | BODY MASS INDEX: 21.68 KG/M2 | HEIGHT: 64 IN | WEIGHT: 127 LBS | SYSTOLIC BLOOD PRESSURE: 104 MMHG

## 2024-12-20 DIAGNOSIS — N95.8 GENITOURINARY SYNDROME OF MENOPAUSE: ICD-10-CM

## 2024-12-20 DIAGNOSIS — N89.8 VAGINAL DISCHARGE: ICD-10-CM

## 2024-12-20 DIAGNOSIS — R30.0 DYSURIA: ICD-10-CM

## 2024-12-20 DIAGNOSIS — N94.9 VAGINAL DISCOMFORT: ICD-10-CM

## 2024-12-20 DIAGNOSIS — N89.8 VAGINAL DISCHARGE: Primary | ICD-10-CM

## 2024-12-20 DIAGNOSIS — N95.8 GENITOURINARY SYNDROME OF MENOPAUSE: Primary | ICD-10-CM

## 2024-12-20 LAB
CLUE CELLS: NORMAL
TRICHOMONAS, WET PREP: NORMAL
WBC'S/HIGH POWER FIELD, WET PREP: NORMAL
YEAST, WET PREP: NORMAL

## 2024-12-20 PROCEDURE — 87210 SMEAR WET MOUNT SALINE/INK: CPT | Performed by: STUDENT IN AN ORGANIZED HEALTH CARE EDUCATION/TRAINING PROGRAM

## 2024-12-20 PROCEDURE — 99204 OFFICE O/P NEW MOD 45 MIN: CPT | Performed by: STUDENT IN AN ORGANIZED HEALTH CARE EDUCATION/TRAINING PROGRAM

## 2024-12-20 RX ORDER — ESTRADIOL 0.1 MG/G
CREAM VAGINAL
Qty: 42.5 G | Refills: 1 | Status: SHIPPED | OUTPATIENT
Start: 2024-12-20

## 2024-12-20 NOTE — NURSING NOTE
"Chief Complaint   Patient presents with    Urinary Problem     Started in October, on and off symptoms, none right now, previous urgency and burning, c/o [place]       Initial /60   Ht 1.626 m (5' 4\")   Wt 57.6 kg (127 lb)   LMP 2013 (Exact Date)   BMI 21.80 kg/m   Estimated body mass index is 21.8 kg/m  as calculated from the following:    Height as of this encounter: 1.626 m (5' 4\").    Weight as of this encounter: 57.6 kg (127 lb).  BP completed using cuff size: regular    Questioned patient about current smoking habits.  Pt. has never smoked.          The following HM Due: NONE    Previous total hysterectomy  Leaves for vacation tomorrow  Is taking a probiotic    Carolynn England CMA on 2024 at 10:49 AM        "

## 2024-12-20 NOTE — PROGRESS NOTES
"[facility]  Gynecology Office Visit    CC: ***    S:  Jie Siddiqui is a 54 year old  who presents for ***. She is here ***alone.    None  - Macrobid + Diflucan    - E coli  >   Bactrim > Macrobid    \- Valtrex?    ==================    Oct \"meds b/c despate\"  Went away    Went in again   November  Got a second med b/c first not working    December - valtrex due to history of herpes    Symptoms ongonig  Ready to go to ED  Slowly getting better  An't woriout   Can't be intimate  Now has anxiety about it. Worrhie about wiping too heard    Has HA; feels like when she was on Lupron  No hot flashes  No MDD  No psych issues    No blood, discharge, smells    Something efels like not right  Feels liek catheter pulled too quick   Like a scrub brush in urethra  Burning   Urgentcy     [place] tomorrow for a week  ***Azo bring    Lasting coupel a days to week  No changes to hygiene  Bar soap, not on vulva  Nothing internal  No douchign  Shave ***  No baths  No detergetn change  [place] brief    Itchign and burnign. All inside    No dryness    Has ovaries     Has ovaries    Was talomg p,pmostat   ===============    She denies*** recent illness, fatigue, depressed mood, weight loss, fever, chills, HA, loss of taste/smell, rhinorrhea, cough, sore throat, chest pain, palpitations, shortness of breath, appetite change, nausea, vomiting, abdominal pain, dysuria, urgency, frequency, diarrhea, constipation, vaginal bleeding, increased/malodorous vaginal discharge, vaginal itch/burn, increased lower extremity edema.    OB History:  -   - Pregnancy history:***  - ***    Gyn History:  - LMP: ***  - Menses: above***  - Menses are ***regular, occur every ***, bleeding lasts *** days, bleeding is ***moderate. ***Denies missed periods. ***Denies bleeding between periods. Pain is ***.  - ***Sexually active with ***1 fe***male  partner***s  - Contraception: ***  - STI history:***  - Last pap: ***  - " Dyspareunia: No***  - Mammogram: ***  - Menopausal symptoms: ***No  - Hormone replacement: ***No    Past Medical History:  ***  Past Medical History:   Diagnosis Date    Arthritis Metatarsophalangeal joint with bone spurs    Asthma lifetime    Diabetes (H) gestational    Endometriosis 2000    Polycystic ovarian disease (PCOD) 2000    Thyroid disease related to pregnancy       Problem List:  ***  Patient Active Problem List   Diagnosis    CARDIOVASCULAR SCREENING; LDL GOAL LESS THAN 160    Seasonal allergies    Intermittent asthma    Endometriosis    Polycystic ovaries    Anxiety       Past Surgical History:  ***  Past Surgical History:   Procedure Laterality Date    COLONOSCOPY N/A 11/27/2020    Procedure: COLONOSCOPY (fv);  Surgeon: Axel Mariano MD;  Location: [facility]    ECTOPIC PREGNANCY SURGERY  2003    cyst noted in abdomen not tubes    GYN SURGERY Bilateral 11/26/2013    LAPAROSCOPIC HYSTERECTOMY BILATERAL SALPINGECTOMY and cystoscopy;  Surgeon: Vijay Ruiz MD;  Location: [facility] OR - Path: Endometriosis, Adenomyosis, all benign    LAPAROSCOPIC HYSTERECTOMY TOTAL, BILATERAL SALPINGO-OOPHORECTOMY, COMBINED  11/26/2013    Procedure: COMBINED LAPAROSCOPIC HYSTERECTOMY TOTAL, SALPINGO-OOPHORECTOMY;  LAPAROSCOPIC HYSTERECTOMY BILATERAL SALPINGECTOMY and cystoscopy;  Surgeon: Vijay Ruiz MD;  Location: [facility] OR - Path: Endometriosis, Adenomyosis, all benign    [facility]  [facility], 2004    C/S x2   4/21/01, 1/08/2004    ZZ NONSPECIFIC PROCEDURE  2000    Laparoscopy (endometriosis)  Abstracted 07/16/02    ZZ NONSPECIFIC PROCEDURE  2005    breast augmentation       Home Medications:  ***  Current Outpatient Medications   Medication Instructions    albuterol (PROAIR HFA/PROVENTIL HFA/VENTOLIN HFA) 108 (90 Base) MCG/ACT inhaler INHALE 2 PUFFS INTO THE LUNGS EVERY 4 HOURS AS NEEDED FOR SHORTNESS OF BREATH    cefdinir (OMNICEF) 300 mg, Oral, 2 TIMES DAILY    fluticasone (FLONASE) 50 MCG/ACT  nasal spray 1 spray, Both Nostrils, 2 TIMES DAILY PRN    fluticasone-salmeterol (WIXELA INHUB) 250-50 MCG/ACT inhaler 1 puff, Inhalation, EVERY 12 HOURS    ibuprofen (ADVIL/MOTRIN) 200 mg, EVERY 4 HOURS PRN    loratadine (CLARITIN) 10 mg, DAILY    menthol-zinc oxide (CALMOSEPTINE) 0.44-20.625 % OINT ointment Topical, 4 TIMES DAILY PRN    molnupiravir (LAGEVRIO) 800 mg, Oral, EVERY 12 HOURS    montelukast (SINGULAIR) 10 mg, Oral, AT BEDTIME    nitroFURantoin macrocrystal-monohydrate (MACROBID) 100 mg, Oral, 2 TIMES DAILY    Probiotic Product (PROBIOTIC DAILY PO) No dose, route, or frequency recorded.    sulfamethoxazole-trimethoprim (BACTRIM DS) 800-160 MG tablet 1 tablet, Oral, 2 TIMES DAILY    valACYclovir (VALTREX) 500 mg, Oral, 2 TIMES DAILY    VITAMIN D, CHOLECALCIFEROL, PO 5,000 Units, DAILY       Allergies:  ***  Allergies   Allergen Reactions    Erythromycin Hives    Morphine Hives              Family History:  ***  Family History   Problem Relation Age of Onset    Lipids Father     Prostate Cancer Father     Lung Cancer Mother 76        smoker    Liver Cancer Mother     Other Cancer Mother         small cell    Cancer Paternal Grandmother         breast cancer    Cerebrovascular Disease Paternal Grandmother     Lipids Paternal Grandmother     Breast Cancer Paternal Grandmother     Psychotic Disorder Maternal Aunt         bi polar    Cerebrovascular Disease Paternal Grandfather     Asthma Paternal Grandfather     Mental Illness Other     Asthma No family hx of     C.A.D. No family hx of     Diabetes No family hx of     Hypertension No family hx of     Cancer - colorectal No family hx of     Prostate Cancer No family hx of     Alcohol/Drug No family hx of     Allergies No family hx of     Alzheimer Disease No family hx of     Anesthesia Reaction No family hx of     Arthritis No family hx of     Blood Disease No family hx of     Cardiovascular No family hx of     Colon Cancer No family hx of        Social  "History:  ***  Social History     Tobacco Use    Smoking status: Never    Smokeless tobacco: Never   Vaping Use    Vaping status: Never Used   Substance Use Topics    Alcohol use: Yes     Alcohol/week: 2.0 standard drinks of alcohol     Comment: 2-3 x/wk. only 2 glasses of wine    Drug use: No       ROS:  A 10-point review of systems was performed and is negative except as per HPI.    O:  /60   Ht 1.626 m (5' 4\")   Wt 57.6 kg (127 lb)   LMP 2013 (Exact Date)   BMI 21.80 kg/m      Exam:  GEN: Appears well, NAD  CV: Well perfused  PULM: NWOB  ABD: Soft, non-tender, non-distended  : Normal external genitalia***. Vagina normal***. Cervix normal***. Bimanual with ***no CMT, *** uterus, ***no adnexal masses. Normal discharge, no lesions, no bleeding***. Exam chaperoned by ***  SKIN: Appears normal without rashes or lesions.  EXT: Warm, well perfused, no edema    Labs:  ***    Imaging:  ***    A/P:  Jie Siddiqui is a 54 year old  who presents for ***.    #***  -     Bolivar Seymour MD [facility]  2024 11:03 AM    " Trace ! >=300 !      Urobilinogen Urine 0.2, 1.0 E.U./dL 0.2 >=8.0 !      Nitrite Urine Negative  Negative Positive !      Blood Urine Negative  Negative Negative      Leukocyte Esterase Urine Negative  Negative Large !      WBC Urine 0-5 /HPF /HPF 0-5 10-25 ! 0-5     RBC Urine 0-2 /HPF /HPF None Seen 10-25 ! None Seen     Bacteria Urine None Seen /HPF Few ! Few !      Squamous Epithelial /LPF Urine None Seen /LPF Few ! Few ! Few !     Chlamydia Trachomatis PCR Negative      Negative   CHLAMYDIA TRACHOMATIS/NEISSERIA GONORRHOEAE BY PCR      Rpt   Treponema Antibodies Nonreactive     Nonreactive    WBCs/high power field None    1+ !     Clue cells Absent    Absent     URINE CULTURE   Rpt !      WET PREPARATION    Rpt !     Neisseria gonorrhoeae Negative      Negative   Trichomonas Absent    Absent     Yeast Absent    Absent     !: Data is abnormal  (H): Data is abnormally high  Rpt: View report in Results Review for more information    Urine culture 11/10/24  Culture >100,000 CFU/mL Escherichia coli Abnormal         Resulting Agency: [facility]     Susceptibility     Escherichia coli     DEMETRIO     Ampicillin 4 ug/mL Susceptible     Ampicillin/ Sulbactam <=2 ug/mL Susceptible     Cefazolin <=4 ug/mL Susceptible 1     Cefepime <=1 ug/mL Susceptible     Cefoxitin <=4 ug/mL Susceptible     Ceftazidime <=1 ug/mL Susceptible     Ceftriaxone <=1 ug/mL Susceptible     Ciprofloxacin <=0.25 ug/mL Susceptible     Gentamicin <=1 ug/mL Susceptible     Levofloxacin <=0.12 ug/mL Susceptible     Nitrofurantoin <=16 ug/mL Susceptible     Piperacillin/Tazobactam <=4 ug/mL Susceptible     Tobramycin <=1 ug/mL Susceptible     Trimethoprim/Sulfamethoxazole <=1/19 ug/mL Susceptible              1 Cefazolin DEMETRIO breakpoints are for the treatment of uncomplicated urinary tract infections. For the treatment of systemic infections, please contact the laboratory for additional testing.            A/P:  Jie Siddiqui is a 54 year old  who  presents for with chronic vulvar and urinary symptoms with largely negative testing, suspicious for gsm which is supported by exam demonstrating atrophy.     #GSM  Discussed symptoms, diagnosis, and management options   Rx for vaginal Estrace  Soak and seal prn  Continue azo prn  Stop monistat  Multiplex - treat per results .    Bolivar Seymour MD MPH  [facility]  12/20/2024 11:03 AM

## 2025-01-15 ENCOUNTER — OFFICE VISIT (OUTPATIENT)
Dept: URGENT CARE | Facility: URGENT CARE | Age: 55
End: 2025-01-15
Payer: COMMERCIAL

## 2025-01-15 VITALS
WEIGHT: 125 LBS | SYSTOLIC BLOOD PRESSURE: 112 MMHG | BODY MASS INDEX: 21.34 KG/M2 | OXYGEN SATURATION: 97 % | HEIGHT: 64 IN | TEMPERATURE: 98.5 F | HEART RATE: 84 BPM | RESPIRATION RATE: 18 BRPM | DIASTOLIC BLOOD PRESSURE: 65 MMHG

## 2025-01-15 DIAGNOSIS — J45.41 MODERATE PERSISTENT ASTHMATIC BRONCHITIS WITH ACUTE EXACERBATION: Primary | ICD-10-CM

## 2025-01-15 PROCEDURE — 94640 AIRWAY INHALATION TREATMENT: CPT | Mod: JZ | Performed by: PHYSICIAN ASSISTANT

## 2025-01-15 PROCEDURE — 99214 OFFICE O/P EST MOD 30 MIN: CPT | Mod: 25 | Performed by: PHYSICIAN ASSISTANT

## 2025-01-15 RX ORDER — IPRATROPIUM BROMIDE AND ALBUTEROL SULFATE 2.5; .5 MG/3ML; MG/3ML
1 SOLUTION RESPIRATORY (INHALATION) EVERY 6 HOURS PRN
Qty: 90 ML | Refills: 1 | Status: SHIPPED | OUTPATIENT
Start: 2025-01-15

## 2025-01-15 RX ORDER — IPRATROPIUM BROMIDE AND ALBUTEROL SULFATE 2.5; .5 MG/3ML; MG/3ML
3 SOLUTION RESPIRATORY (INHALATION) ONCE
Status: COMPLETED | OUTPATIENT
Start: 2025-01-15 | End: 2025-01-15

## 2025-01-15 RX ORDER — DOXYCYCLINE 100 MG/1
100 CAPSULE ORAL 2 TIMES DAILY
Qty: 14 CAPSULE | Refills: 0 | Status: SHIPPED | OUTPATIENT
Start: 2025-01-15 | End: 2025-01-22

## 2025-01-15 RX ORDER — PREDNISONE 20 MG/1
40 TABLET ORAL DAILY
Qty: 10 TABLET | Refills: 0 | Status: SHIPPED | OUTPATIENT
Start: 2025-01-15 | End: 2025-01-20

## 2025-01-15 RX ADMIN — IPRATROPIUM BROMIDE AND ALBUTEROL SULFATE 3 ML: 2.5; .5 SOLUTION RESPIRATORY (INHALATION) at 16:58

## 2025-01-15 NOTE — PROGRESS NOTES
Assessment & Plan     Moderate persistent asthmatic bronchitis with acute exacerbation  On exam patient is nontoxic-appearing.  DuoNeb treatment in urgent care today with mild improvement in symptoms.  Recommend to continue DuoNeb treatment at home.  Doxycycline is prescribed for presumed bacterial bronchitis.  Prednisone also prescribed.  Albuterol inhaler as needed.  Continue Wixela inhaler as scheduled.  Close monitoring of symptoms.  Patient educational information provided regarding course of symptoms.  Follow-up if any worsening symptoms.  Patient agrees with the plan.  - ipratropium - albuterol 0.5 mg/2.5 mg/3 mL (DUONEB) neb solution 3 mL  - Nebulizer and Supplies Order for DME - ONLY FOR DME  - ipratropium - albuterol 0.5 mg/2.5 mg/3 mL (DUONEB) 0.5-2.5 (3) MG/3ML neb solution  Dispense: 90 mL; Refill: 1  - predniSONE (DELTASONE) 20 MG tablet  Dispense: 10 tablet; Refill: 0  - doxycycline hyclate (VIBRAMYCIN) 100 MG capsule  Dispense: 14 capsule; Refill: 0       Return in about 1 week (around 1/22/2025) for Symptoms failing to improve.    LISANDRO Melo [facility] URGENT [facility] ALEYDA Ceron is a 54 year old female who presents to clinic today for the following health issues:  Chief Complaint   Patient presents with    Urgent Care     Cough , congestion , body aches  x 2 weeks. Sometimes cough is productive. Covid negative at home.        HPI      URI Adult    Onset of symptoms was 2 week(s) ago.  Course of illness is worsening.    Severity moderate  Current and Associated symptoms: cough sometimes productive, congestion, body aches, shortness of breath  No fever  Treatment measures tried include Inhaler (name: albuterol-- using more frequently since last night), uses wixela inhaler daily (maintenance)  Predisposing factors include HX of asthma.  No v/d  Patient reports negative Home Covid test      Review of Systems  Constitutional, HEENT, cardiovascular, pulmonary, GI, ,  "musculoskeletal, neuro, skin, endocrine and psych systems are negative, except as otherwise noted.      Objective    /65   Pulse 84   Temp 98.5  F (36.9  C) (Oral)   Resp 18   Ht 1.626 m (5' 4\")   Wt 56.7 kg (125 lb)   LMP 11/22/2013 (Exact Date)   SpO2 97%   BMI 21.46 kg/m    Physical Exam   GENERAL: alert , frequent coughing in exam room  HENT: ear canals and TM's normal, mouth without ulcers or lesions, throat is  moist and pink  RESP: lungs with congestion, coarse breath sounds  CV: regular rate and rhythm, normal S1 S2  MS: no gross musculoskeletal defects noted, no edema  SKIN: no suspicious lesions or rashes          "

## 2025-04-14 ENCOUNTER — MYC REFILL (OUTPATIENT)
Dept: INTERNAL MEDICINE | Facility: CLINIC | Age: 55
End: 2025-04-14
Payer: COMMERCIAL

## 2025-04-14 DIAGNOSIS — J45.30 MILD PERSISTENT ASTHMA WITHOUT COMPLICATION: ICD-10-CM

## 2025-04-14 RX ORDER — FLUTICASONE PROPIONATE AND SALMETEROL 250; 50 UG/1; UG/1
1 POWDER RESPIRATORY (INHALATION) EVERY 12 HOURS
OUTPATIENT
Start: 2025-04-14

## 2025-04-14 RX ORDER — FLUTICASONE PROPIONATE AND SALMETEROL 250; 50 UG/1; UG/1
1 POWDER RESPIRATORY (INHALATION) EVERY 12 HOURS
Qty: 60 EACH | Refills: 2 | Status: SHIPPED | OUTPATIENT
Start: 2025-04-14 | End: 2025-04-14

## 2025-04-14 RX ORDER — FLUTICASONE PROPIONATE AND SALMETEROL 250; 50 UG/1; UG/1
1 POWDER RESPIRATORY (INHALATION) EVERY 12 HOURS
Qty: 60 EACH | Refills: 2 | Status: SHIPPED | OUTPATIENT
Start: 2025-04-14

## 2025-05-23 ENCOUNTER — MYC MEDICAL ADVICE (OUTPATIENT)
Dept: OBGYN | Facility: CLINIC | Age: 55
End: 2025-05-23
Payer: COMMERCIAL

## 2025-05-23 NOTE — TELEPHONE ENCOUNTER
LOV 12/20/24    Pt feels menopause symptoms are returning. Same as was discussed at last appt.    Vaginal burning, internal discomfort, urinary frequency.     No discharge or bleeding.     No fever.    Pt using Estrace cream as prescribed, twice a week.   Pt felt like symptoms did improve at first but now symptoms are coming back.     Pt went to urgent care and had UA today that she states it was negative.     Would like to discuss other options for treatment and pt states this is very disruptive to her life.     Pt was offered appt with available provider earlier today prior to her going to UC and she declined.     Appt made with Dr Seymour on 5/28.    Katelin JUSTICE RN  OB/GYN [facility]

## 2025-05-25 NOTE — TELEPHONE ENCOUNTER
Given worsening symptoms I recommend she start to use the cream daily again for 2 weeks. She can then taper back to 3-4x a week.   Wong Seymour MD

## 2025-05-28 ENCOUNTER — OFFICE VISIT (OUTPATIENT)
Dept: OBGYN | Facility: CLINIC | Age: 55
End: 2025-05-28
Payer: COMMERCIAL

## 2025-05-28 ENCOUNTER — RESULTS FOLLOW-UP (OUTPATIENT)
Dept: OBGYN | Facility: CLINIC | Age: 55
End: 2025-05-28

## 2025-05-28 VITALS
HEIGHT: 64 IN | BODY MASS INDEX: 21.43 KG/M2 | SYSTOLIC BLOOD PRESSURE: 110 MMHG | DIASTOLIC BLOOD PRESSURE: 60 MMHG | WEIGHT: 125.5 LBS

## 2025-05-28 DIAGNOSIS — N95.8 GENITOURINARY SYNDROME OF MENOPAUSE: Primary | ICD-10-CM

## 2025-05-28 DIAGNOSIS — R30.0 DYSURIA: ICD-10-CM

## 2025-05-28 LAB
ALBUMIN UR-MCNC: NEGATIVE MG/DL
AMORPH CRY #/AREA URNS HPF: ABNORMAL /HPF
APPEARANCE UR: ABNORMAL
BACTERIA #/AREA URNS HPF: ABNORMAL /HPF
BACTERIAL VAGINOSIS VAG-IMP: NEGATIVE
BILIRUB UR QL STRIP: NEGATIVE
CANDIDA DNA VAG QL NAA+PROBE: NOT DETECTED
CANDIDA GLABRATA / CANDIDA KRUSEI DNA: NOT DETECTED
COLOR UR AUTO: YELLOW
GLUCOSE UR STRIP-MCNC: NEGATIVE MG/DL
HGB UR QL STRIP: NEGATIVE
KETONES UR STRIP-MCNC: NEGATIVE MG/DL
LEUKOCYTE ESTERASE UR QL STRIP: NEGATIVE
MUCOUS THREADS #/AREA URNS LPF: PRESENT /LPF
NITRATE UR QL: NEGATIVE
PH UR STRIP: 7.5 [PH] (ref 5–7)
RBC #/AREA URNS AUTO: ABNORMAL /HPF
SP GR UR STRIP: 1.01 (ref 1–1.03)
SQUAMOUS #/AREA URNS AUTO: ABNORMAL /LPF
T VAGINALIS DNA VAG QL NAA+PROBE: NOT DETECTED
UROBILINOGEN UR STRIP-ACNC: 0.2 E.U./DL
WBC #/AREA URNS AUTO: ABNORMAL /HPF

## 2025-05-28 PROCEDURE — 87591 N.GONORRHOEAE DNA AMP PROB: CPT | Performed by: STUDENT IN AN ORGANIZED HEALTH CARE EDUCATION/TRAINING PROGRAM

## 2025-05-28 PROCEDURE — 3074F SYST BP LT 130 MM HG: CPT | Performed by: STUDENT IN AN ORGANIZED HEALTH CARE EDUCATION/TRAINING PROGRAM

## 2025-05-28 PROCEDURE — 81001 URINALYSIS AUTO W/SCOPE: CPT | Performed by: STUDENT IN AN ORGANIZED HEALTH CARE EDUCATION/TRAINING PROGRAM

## 2025-05-28 PROCEDURE — 87086 URINE CULTURE/COLONY COUNT: CPT | Performed by: STUDENT IN AN ORGANIZED HEALTH CARE EDUCATION/TRAINING PROGRAM

## 2025-05-28 PROCEDURE — 99459 PELVIC EXAMINATION: CPT | Performed by: STUDENT IN AN ORGANIZED HEALTH CARE EDUCATION/TRAINING PROGRAM

## 2025-05-28 PROCEDURE — 3078F DIAST BP <80 MM HG: CPT | Performed by: STUDENT IN AN ORGANIZED HEALTH CARE EDUCATION/TRAINING PROGRAM

## 2025-05-28 PROCEDURE — G2211 COMPLEX E/M VISIT ADD ON: HCPCS | Performed by: STUDENT IN AN ORGANIZED HEALTH CARE EDUCATION/TRAINING PROGRAM

## 2025-05-28 PROCEDURE — 99214 OFFICE O/P EST MOD 30 MIN: CPT | Performed by: STUDENT IN AN ORGANIZED HEALTH CARE EDUCATION/TRAINING PROGRAM

## 2025-05-28 PROCEDURE — 81515 NFCT DS BV&VAGINITIS DNA ALG: CPT | Performed by: STUDENT IN AN ORGANIZED HEALTH CARE EDUCATION/TRAINING PROGRAM

## 2025-05-28 PROCEDURE — 87491 CHLMYD TRACH DNA AMP PROBE: CPT | Performed by: STUDENT IN AN ORGANIZED HEALTH CARE EDUCATION/TRAINING PROGRAM

## 2025-05-28 NOTE — PROGRESS NOTES
[facility]  Gynecology Office Visit    CC: GSM flare    S:  Jie Siddiqui is a 55 year old  who presents for f/up of the above. She is here alone.     - GSM symptoms under good control until 1-2 weeks ago. Did have a yeast infection  few weeks ago treated with OTC monistat.  - Prior to this was using estrace ~2x a week but wasn't really keeping track. Using FT worth.  - Since flare now using about 2 FT worth.   - Mostly applying to outside.   - Symptoms include - UTI symptoms, burning, feels need to go to bathroom, urethra feels on fire  - Nothing seemed to prompt this  - No change in frequency of sex or type of sex or toys/condoms etc   - No change in shaving - does daily for ~20 yars  - No discharge or bleeding  - No mental/emotional changes  - Has used Repelns, not sure if it has been helpful.     O:  LMP 2013 (Exact Date)     Exam:  GEN: Appears well, NAD  : Normal external genitalia. Vagina and urethra pale, mildly atrophic. Vaginal cuff normal. Normal discharge, no lesions, no bleeding. Exam chaperoned by Penny Newell LPN   SKIN: Appears normal without rashes or lesions.  EXT: Warm, well perfused, no edema    A/P:  Jie Siddiqui is a 55 year old  who presents for f/up of GSM which has recently flared.    #GSM  - Exam negative  - Multiplex, GC/CT, UA/UC - treat per results. No BV symptoms so no treatment for clue cells  - Recommend increased amount of PV estrogen x2 weeks (2 fingertips) then back to a maintenance dose of 2x a week. Can do 3-4x a week fi needed. May also need larger amount per application. Discussed applying inside vagina; not just on outside and applying over urethra where pain is worst.   - Continue to use Replens PRN  - If not improved in 2 weeks f/up. Discussed option of changing to premarin, or changing to ring or insert.     Bolivar Seymour MD MPH  [facility]  2025 10:24 AM

## 2025-05-29 LAB
BACTERIA UR CULT: NO GROWTH
C TRACH DNA SPEC QL PROBE+SIG AMP: NEGATIVE
N GONORRHOEA DNA SPEC QL NAA+PROBE: NEGATIVE
SPECIMEN TYPE: NORMAL

## 2025-06-14 DIAGNOSIS — N95.8 GENITOURINARY SYNDROME OF MENOPAUSE: ICD-10-CM

## 2025-06-16 RX ORDER — ESTRADIOL 0.1 MG/G
CREAM VAGINAL
Qty: 42.5 G | Refills: 1 | Status: SHIPPED | OUTPATIENT
Start: 2025-06-16

## 2025-06-18 DIAGNOSIS — J45.30 MILD PERSISTENT ASTHMA WITHOUT COMPLICATION: ICD-10-CM

## 2025-06-18 RX ORDER — FLUTICASONE PROPIONATE AND SALMETEROL 250; 50 UG/1; UG/1
1 POWDER RESPIRATORY (INHALATION) EVERY 12 HOURS
Qty: 60 EACH | Refills: 1 | Status: SHIPPED | OUTPATIENT
Start: 2025-06-18

## 2025-08-20 ENCOUNTER — HOSPITAL ENCOUNTER (OUTPATIENT)
Dept: MAMMOGRAPHY | Facility: CLINIC | Age: 55
Discharge: HOME | End: 2025-08-20
Attending: INTERNAL MEDICINE
Payer: COMMERCIAL

## 2025-08-20 DIAGNOSIS — Z12.31 VISIT FOR SCREENING MAMMOGRAM: ICD-10-CM

## 2025-08-20 PROCEDURE — 77063 BREAST TOMOSYNTHESIS BI: CPT

## (undated) DEVICE — KIT ENDO TURNOVER/PROCEDURE W/CLEAN A SCOPE LINERS 103888

## (undated) RX ORDER — ALBUTEROL SULFATE 0.83 MG/ML
SOLUTION RESPIRATORY (INHALATION)
Status: DISPENSED
Start: 2018-09-26

## (undated) RX ORDER — SIMETHICONE 40MG/0.6ML
SUSPENSION, DROPS(FINAL DOSAGE FORM)(ML) ORAL
Status: DISPENSED
Start: 2020-11-27

## (undated) RX ORDER — FENTANYL CITRATE 50 UG/ML
INJECTION, SOLUTION INTRAMUSCULAR; INTRAVENOUS
Status: DISPENSED
Start: 2020-11-27